# Patient Record
Sex: FEMALE | Race: ASIAN
[De-identification: names, ages, dates, MRNs, and addresses within clinical notes are randomized per-mention and may not be internally consistent; named-entity substitution may affect disease eponyms.]

---

## 2021-12-04 ENCOUNTER — HOSPITAL ENCOUNTER (EMERGENCY)
Dept: HOSPITAL 76 - ED | Age: 51
Discharge: HOME | End: 2021-12-04
Payer: COMMERCIAL

## 2021-12-04 VITALS — DIASTOLIC BLOOD PRESSURE: 95 MMHG | SYSTOLIC BLOOD PRESSURE: 119 MMHG

## 2021-12-04 DIAGNOSIS — D72.819: ICD-10-CM

## 2021-12-04 DIAGNOSIS — U07.1: Primary | ICD-10-CM

## 2021-12-04 DIAGNOSIS — J12.82: ICD-10-CM

## 2021-12-04 LAB
ALBUMIN DIAFP-MCNC: 3.8 G/DL (ref 3.2–5.5)
ALBUMIN/GLOB SERPL: 1.2 {RATIO} (ref 1–2.2)
ALP SERPL-CCNC: 46 IU/L (ref 42–121)
ALT SERPL W P-5'-P-CCNC: 20 IU/L (ref 10–60)
ANION GAP SERPL CALCULATED.4IONS-SCNC: 10 MMOL/L (ref 6–13)
AST SERPL W P-5'-P-CCNC: 35 IU/L (ref 10–42)
BASOPHILS # BLD MANUAL: 0 10^3/UL (ref 0–0.1)
BASOPHILS NFR BLD AUTO: 0.4 %
BILIRUB BLD-MCNC: 0.5 MG/DL (ref 0.2–1)
BUN SERPL-MCNC: 8 MG/DL (ref 6–20)
CALCIUM UR-MCNC: 8.3 MG/DL (ref 8.5–10.3)
CHLORIDE SERPL-SCNC: 102 MMOL/L (ref 101–111)
CO2 SERPL-SCNC: 24 MMOL/L (ref 21–32)
CREAT SERPLBLD-SCNC: 0.6 MG/DL (ref 0.4–1)
EOSINOPHIL # BLD MANUAL: 0 10^3/UL (ref 0–0.7)
EOSINOPHIL NFR BLD AUTO: 0.4 %
ERYTHROCYTE [DISTWIDTH] IN BLOOD BY AUTOMATED COUNT: 12.3 % (ref 12–15)
GFRSERPLBLD MDRD-ARVRAT: 106 ML/MIN/{1.73_M2} (ref 89–?)
GLOBULIN SER-MCNC: 3.3 G/DL (ref 2.1–4.2)
GLUCOSE SERPL-MCNC: 94 MG/DL (ref 70–100)
HCT VFR BLD AUTO: 39.6 % (ref 37–47)
HGB UR QL STRIP: 13 G/DL (ref 12–16)
LIPASE SERPL-CCNC: 29 U/L (ref 22–51)
LYMPH ABN NFR BLD MANUAL: 0 %
LYMPHOBLASTS # BLD: 21 %
LYMPHOCYTES # BLD MANUAL: 0.6 10^3/UL (ref 1.5–3.5)
LYMPHOCYTES NFR BLD AUTO: 22.5 %
MANUAL DIF COMMENT BLD-IMP: (no result)
MCH RBC QN AUTO: 27.8 PG (ref 27–31)
MCHC RBC AUTO-ENTMCNC: 32.8 G/DL (ref 32–36)
MCV RBC AUTO: 84.6 FL (ref 81–99)
METAMYELOCYTES NFR BLD: 2 %
MONOCYTES # BLD MANUAL: 0.1 10^3/UL (ref 0–1)
MONOCYTES NFR BLD AUTO: 3.7 %
NEUTROPHILS # SNV AUTO: 2.7 X10^3/UL (ref 4.8–10.8)
NEUTROPHILS NFR BLD AUTO: 73 %
NEUTROPHILS NFR BLD MANUAL: 2 10^3/UL (ref 1.5–6.6)
NEUTS BAND NFR BLD: 13 %
PDW BLD AUTO: 9.4 FL (ref 7.9–10.8)
PLAT MORPH BLD: (no result)
PLATELET # BLD: 172 10^3/UL (ref 130–450)
PLATELET BLD QL SMEAR: (no result)
POTASSIUM SERPL-SCNC: 3.1 MMOL/L (ref 3.5–5)
PROT SPEC-MCNC: 7.1 G/DL (ref 6.7–8.2)
RBC MAR: 4.68 10^6/UL (ref 4.2–5.4)
RBC MORPH BLD: (no result)
SODIUM SERPLBLD-SCNC: 136 MMOL/L (ref 135–145)

## 2021-12-04 PROCEDURE — 83690 ASSAY OF LIPASE: CPT

## 2021-12-04 PROCEDURE — 85025 COMPLETE CBC W/AUTO DIFF WBC: CPT

## 2021-12-04 PROCEDURE — 80053 COMPREHEN METABOLIC PANEL: CPT

## 2021-12-04 PROCEDURE — 99284 EMERGENCY DEPT VISIT MOD MDM: CPT

## 2021-12-04 PROCEDURE — 36415 COLL VENOUS BLD VENIPUNCTURE: CPT

## 2021-12-04 NOTE — ED PHYSICIAN DOCUMENTATION
History of Present Illness





- Stated complaint


Stated Complaint: C+





- Chief complaint


Chief Complaint: Resp





- Additonal information


Additional information: 





51-year-old female presents emergency department for evaluation of cough and 

chest pain.  States that she began having cough loss of taste and smell 1 week 

ago today.  She took a home Covid test and was positive.  She repeated the test 

today and remains positive.  She is interested in Mab therapy.  She is not yet 

vaccinated for COVID-19.  Reports that she has a history of vertigo and was 

afraid the vaccine would make it worse.  She is waiting to speak with her 

primary provider.





Patient denies any history of hypertension or diabetes.  She is not immune 

compromised.  She is a non-smoker. BMI is 20.6











Review of Systems


Constitutional: denies: Fever, Chills


Eyes: reports: Reviewed and negative


Ears: reports: Reviewed and negative


Nose: reports: Reviewed and negative


Throat: reports: Reviewed and negative


Cardiac: reports: Chest pain / pressure.  denies: Palpitations, Pedal edema, 

Calf pain, Reviewed and negative


Respiratory: reports: Dyspnea, Cough.  denies: Hemoptysis, Wheezing


GI: reports: Reviewed and negative


: reports: Reviewed and negative


Skin: reports: Reviewed and negative


Musculoskeletal: reports: Reviewed and negative





PD PAST MEDICAL HISTORY





- Present Medications


Home Medications: 


                                Ambulatory Orders











 Medication  Instructions  Recorded  Confirmed


 


Amox/Clav 875/125 [Augmentin] 1 each PO Q12H #14 tablet 12/04/21 


 


Azithromycin [Zithromax] 0 mg PO DAILY #6 tablet 12/04/21 


 


Benzonatate [Tessalon] 100 mg PO TID PRN #30 cap 12/04/21 














- Allergies


Allergies/Adverse Reactions: 


                                    Allergies











Allergy/AdvReac Type Severity Reaction Status Date / Time


 


No Known Drug Allergies Allergy   Verified 12/04/21 16:36














PD ED PE EXPANDED





- General


General: Alert, No acute distress, Well developed/nourished





- Cardiac


Cardiac: Regular Rate, Radial strong equal, Pedal strong equal, Cap refill < 2 

sec.  No: Murmur Present





- Respiratory


Respiratory: Clear to ausultation panfilo.  No: Distress, Labored





- Abdomen


Abdomen: Normal Bowel sounds.  No: Tender to palpation





- Back


Back: Normal exam





- Derm


Derm: Normal color, Warm and dry.  No: Rash





- Neuro


Neuro: Alert and Oriented X 3, CNII-XII intact





- GCS


Eye Opening: Spontaneous


Motor: Obeys Commands


Verbal: Oriented


Total: 15





Results





- Vitals


Vitals: 





                               Vital Signs - 24 hr











  12/04/21





  16:31


 


Temperature 38.2 C H


 


Heart Rate 74


 


Respiratory 19





Rate 


 


Blood Pressure 108/68


 


O2 Saturation 98








                                     Oxygen











O2 Source                      Room air

















PD MEDICAL DECISION MAKING





- ED course


Complexity details: reviewed results, re-evaluated patient, considered 

differential, d/w patient


ED course: 





51-year-old female presents the emergency department for evaluation of cough 

congestion chest pain.  She tested positive for COVID-19 1 week ago when she 

developed symptoms.  She is not vaccinated for COVID-19.





She has no history of cardiac or pulmonary disease.  She is not immune 

compromise.  No history of cancer.  She takes no routinely prescribed 

medications.  She is not a smoker.  Her BMI is 20.6.  Unfortunately she is not a

 candidate for Mab therapy.





Screening chest x-ray today does confirm bilateral infiltrative process 

consistent with a Covid pneumonia.  However reassuringly cardiopulmonary 

auscultation is rather clear.  She is not dyspneic, labored or tachypneic.  Room

 air saturations are 98 to 100%.





Screening labs are consistent with leukopenia often seen in COVID-19 infection. 

 The rest of the labs showed no worrisome or treatable electrolyte 

abnormalities.





Discussed with the patient that she is unfortunately not a candidate for Mab 

therapy.  I will send a prescription for Augmentin as well as azithromycin and 

Tessalon Perles to the pharmacy.  Current guidelines do not recommend outpatient

 steroid treatment for COVID-19 therefore will defer today.





Return precautions were discussed for worsening symptoms, increased dyspnea any 

hypoxia or severely labored breathing.





Departure





- Departure


Disposition: 01 Home, Self Care


Clinical Impression: 


 Pneumonia due to COVID-19 virus





Condition: Serious


Record reviewed to determine appropriate education?: Yes


Follow-Up: 


Rad Packer MD [Primary Care Provider] - 


Prescriptions: 


Amox/Clav 875/125 [Augmentin] 1 each PO Q12H #14 tablet


Benzonatate [Tessalon] 100 mg PO TID PRN #30 cap


 PRN Reason: Cough


Azithromycin [Zithromax] 0 mg PO DAILY #6 tablet


Comments: 


Yuki you tested positive at home for COVID-19 a week ago.  You came to the ER 

today with chest pain and a cough.  The x-ray does confirm that you have COVID-

19 pneumonia.  However reassuringly the auscultation of your lungs is normal.  

Your oxygen saturations are normal without the requirement of oxygen therapy.





Your screening labs show a decrease in your white blood cell count which is 

often seen with COVID-19 infections.





You do not meet the FDA criteria for monoclonal antibody therapy as you are body

 mass index is not elevated enough, you do not have any pre-existing immune 

compromise, cardiac or lung conditions.





I have [place] a [place]  I have also sent a prescription for Tessalon Perles a 

medication that can be helpful in reducing the severity of your cough.





In general most patients with COVID-19 will begin to feel better after about 7-

14 days. I recommend that you continue to quarantine for at least 1 more week.





If at any point you are having increased difficulty breathing, you have oxygen 

saturations less than 92% at home, you have a racing heart rate higher than 110,

 you faint or feel that your symptoms are worsening then do not hesitate to 

return immediately to the ER for a second evaluation.

## 2021-12-04 NOTE — XRAY REPORT
PROCEDURE:  Chest 1 View X-Ray

 

INDICATIONS:  C+

 

TECHNIQUE:  One view of the chest was acquired.  

 

COMPARISON:  None.

 

FINDINGS:  

 

Surgical changes and devices:  None.  

 

Lungs and pleura:  There are bilateral patchy indistinct opacities, right greater than left, with a b
asilar predominance. No pleural effusions or pneumothorax.  

 

Mediastinum:  Mediastinal contours appear normal.  Heart size is normal.  

 

Bones and chest wall:  No suspicious bony lesions.  Overlying soft tissues appear unremarkable.  

 

IMPRESSION:  

 

1. Patchy bilateral indistinct opacities suggestive of atypical pneumonia given clinical history.

 

Reviewed by: Russell Badillo MD on 12/4/2021 4:20 PM [facility]

Approved by: Russell Badillo MD on 12/4/2021 4:20 PM [facility]

 

 

Station ID:  [id number]

## 2021-12-08 ENCOUNTER — HOSPITAL ENCOUNTER (OUTPATIENT)
Dept: HOSPITAL 76 - EMS | Age: 51
Discharge: ANOTHER HOSPITAL | End: 2021-12-08
Payer: COMMERCIAL

## 2021-12-08 ENCOUNTER — HOSPITAL ENCOUNTER (INPATIENT)
Dept: HOSPITAL 76 - ED | Age: 51
LOS: 7 days | Discharge: HOME | DRG: 177 | End: 2021-12-15
Attending: NURSE PRACTITIONER | Admitting: SPECIALIST
Payer: COMMERCIAL

## 2021-12-08 DIAGNOSIS — U07.1: Primary | ICD-10-CM
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
## 2021-12-08 DIAGNOSIS — D72.829: ICD-10-CM

## 2021-12-08 DIAGNOSIS — J12.82: ICD-10-CM

## 2021-12-08 DIAGNOSIS — F17.210: ICD-10-CM

## 2021-12-08 DIAGNOSIS — T38.0X5A: ICD-10-CM

## 2021-12-08 DIAGNOSIS — J96.01: ICD-10-CM

## 2021-12-08 DIAGNOSIS — E86.0: ICD-10-CM

## 2021-12-08 LAB
ALBUMIN DIAFP-MCNC: 3.3 G/DL (ref 3.2–5.5)
ALBUMIN/GLOB SERPL: 0.8 {RATIO} (ref 1–2.2)
ALP SERPL-CCNC: 49 IU/L (ref 42–121)
ALT SERPL W P-5'-P-CCNC: 37 IU/L (ref 10–60)
ANION GAP SERPL CALCULATED.4IONS-SCNC: 14 MMOL/L (ref 6–13)
AST SERPL W P-5'-P-CCNC: 51 IU/L (ref 10–42)
B PARAPERT DNA SPEC QL NAA+PROBE: NOT DETECTED
B PERT DNA SPEC QL NAA+PROBE: NOT DETECTED
BASOPHILS # BLD MANUAL: 0.1 10^3/UL (ref 0–0.1)
BASOPHILS NFR BLD AUTO: 0.3 %
BASOPHILS NFR SPEC MANUAL: 2 %
BILIRUB BLD-MCNC: 0.6 MG/DL (ref 0.2–1)
BUN SERPL-MCNC: 8 MG/DL (ref 6–20)
C PNEUM DNA NPH QL NAA+NON-PROBE: NOT DETECTED
CALCIUM UR-MCNC: 8.3 MG/DL (ref 8.5–10.3)
CHLORIDE SERPL-SCNC: 101 MMOL/L (ref 101–111)
CO2 SERPL-SCNC: 23 MMOL/L (ref 21–32)
CREAT SERPLBLD-SCNC: 0.5 MG/DL (ref 0.4–1)
EOSINOPHIL # BLD MANUAL: 0 10^3/UL (ref 0–0.7)
EOSINOPHIL NFR BLD AUTO: 0.3 %
ERYTHROCYTE [DISTWIDTH] IN BLOOD BY AUTOMATED COUNT: 12.3 % (ref 12–15)
FLUAV RNA RESP QL NAA+PROBE: NOT DETECTED
GFRSERPLBLD MDRD-ARVRAT: 131 ML/MIN/{1.73_M2} (ref 89–?)
GLOBULIN SER-MCNC: 3.9 G/DL (ref 2.1–4.2)
GLUCOSE SERPL-MCNC: 88 MG/DL (ref 70–100)
HAEM INFLU B DNA SPEC QL NAA+PROBE: NOT DETECTED
HCOV 229E RNA SPEC QL NAA+PROBE: NOT DETECTED
HCOV HKU1 RNA UPPER RESP QL NAA+PROBE: NOT DETECTED
HCOV NL63 RNA ASPIRATE QL NAA+PROBE: NOT DETECTED
HCOV OC43 RNA SPEC QL NAA+PROBE: NOT DETECTED
HCT VFR BLD AUTO: 41.6 % (ref 37–47)
HGB UR QL STRIP: 13.7 G/DL (ref 12–16)
HMPV AG SPEC QL: NOT DETECTED
HPIV1 RNA NPH QL NAA+PROBE: NOT DETECTED
HPIV2 SPEC QL CULT: NOT DETECTED
HPIV3 AB TITR SER CF: NOT DETECTED {TITER}
HPIV4 RNA SPEC QL NAA+PROBE: NOT DETECTED
LIPASE SERPL-CCNC: 26 U/L (ref 22–51)
LYMPH ABN NFR BLD MANUAL: 0 %
LYMPHOBLASTS # BLD: 13 %
LYMPHOCYTES # BLD MANUAL: 0.8 10^3/UL (ref 1.5–3.5)
LYMPHOCYTES NFR BLD AUTO: 15.5 %
M PNEUMO DNA SPEC QL NAA+PROBE: NOT DETECTED
MANUAL DIF COMMENT BLD-IMP: (no result)
MCH RBC QN AUTO: 27.8 PG (ref 27–31)
MCHC RBC AUTO-ENTMCNC: 32.9 G/DL (ref 32–36)
MCV RBC AUTO: 84.6 FL (ref 81–99)
MONOCYTES # BLD MANUAL: 0.4 10^3/UL (ref 0–1)
MONOCYTES NFR BLD AUTO: 8.9 %
NEUTROPHILS # SNV AUTO: 6.1 X10^3/UL (ref 4.8–10.8)
NEUTROPHILS NFR BLD AUTO: 73.4 %
NEUTROPHILS NFR BLD MANUAL: 4.8 10^3/UL (ref 1.5–6.6)
NEUTS BAND NFR BLD: 14 %
PDW BLD AUTO: 8.8 FL (ref 7.9–10.8)
PLAT MORPH BLD: (no result)
PLATELET # BLD: 301 10^3/UL (ref 130–450)
PLATELET BLD QL SMEAR: (no result)
POTASSIUM SERPL-SCNC: 3.7 MMOL/L (ref 3.5–5)
PROT SPEC-MCNC: 7.2 G/DL (ref 6.7–8.2)
RBC MAR: 4.92 10^6/UL (ref 4.2–5.4)
RSV RNA RESP QL NAA+PROBE: NOT DETECTED
RV+EV RNA SPEC QL NAA+PROBE: NOT DETECTED
SARS-COV-2 RNA PNL SPEC NAA+PROBE: DETECTED
SODIUM SERPLBLD-SCNC: 138 MMOL/L (ref 135–145)

## 2021-12-08 PROCEDURE — 80053 COMPREHEN METABOLIC PANEL: CPT

## 2021-12-08 PROCEDURE — 80048 BASIC METABOLIC PNL TOTAL CA: CPT

## 2021-12-08 PROCEDURE — 96361 HYDRATE IV INFUSION ADD-ON: CPT

## 2021-12-08 PROCEDURE — 99285 EMERGENCY DEPT VISIT HI MDM: CPT

## 2021-12-08 PROCEDURE — 96374 THER/PROPH/DIAG INJ IV PUSH: CPT

## 2021-12-08 PROCEDURE — 85025 COMPLETE CBC W/AUTO DIFF WBC: CPT

## 2021-12-08 PROCEDURE — 87070 CULTURE OTHR SPECIMN AEROBIC: CPT

## 2021-12-08 PROCEDURE — 0202U NFCT DS 22 TRGT SARS-COV-2: CPT

## 2021-12-08 PROCEDURE — 83690 ASSAY OF LIPASE: CPT

## 2021-12-08 PROCEDURE — 94640 AIRWAY INHALATION TREATMENT: CPT

## 2021-12-08 PROCEDURE — 36415 COLL VENOUS BLD VENIPUNCTURE: CPT

## 2021-12-08 PROCEDURE — 80076 HEPATIC FUNCTION PANEL: CPT

## 2021-12-08 PROCEDURE — XW033E5 INTRODUCTION OF REMDESIVIR ANTI-INFECTIVE INTO PERIPHERAL VEIN, PERCUTANEOUS APPROACH, NEW TECHNOLOGY GROUP 5: ICD-10-PCS | Performed by: SPECIALIST

## 2021-12-08 PROCEDURE — 3E0333Z INTRODUCTION OF ANTI-INFLAMMATORY INTO PERIPHERAL VEIN, PERCUTANEOUS APPROACH: ICD-10-PCS | Performed by: SPECIALIST

## 2021-12-08 PROCEDURE — 87205 SMEAR GRAM STAIN: CPT

## 2021-12-08 PROCEDURE — 99284 EMERGENCY DEPT VISIT MOD MDM: CPT

## 2021-12-08 PROCEDURE — 94761 N-INVAS EAR/PLS OXIMETRY MLT: CPT

## 2021-12-08 PROCEDURE — 96375 TX/PRO/DX INJ NEW DRUG ADDON: CPT

## 2021-12-08 PROCEDURE — 71045 X-RAY EXAM CHEST 1 VIEW: CPT

## 2021-12-08 RX ADMIN — OXYCODONE PRN MG: 5 TABLET ORAL at 21:51

## 2021-12-08 RX ADMIN — SODIUM CHLORIDE, POTASSIUM CHLORIDE, SODIUM LACTATE AND CALCIUM CHLORIDE SCH MLS/HR: 600; 310; 30; 20 INJECTION, SOLUTION INTRAVENOUS at 13:47

## 2021-12-08 RX ADMIN — OXYCODONE PRN MG: 5 TABLET ORAL at 17:21

## 2021-12-08 RX ADMIN — SODIUM CHLORIDE, PRESERVATIVE FREE SCH: 5 INJECTION INTRAVENOUS at 23:52

## 2021-12-08 RX ADMIN — SODIUM CHLORIDE, POTASSIUM CHLORIDE, SODIUM LACTATE AND CALCIUM CHLORIDE SCH MLS/HR: 600; 310; 30; 20 INJECTION, SOLUTION INTRAVENOUS at 23:52

## 2021-12-08 RX ADMIN — SODIUM CHLORIDE, PRESERVATIVE FREE SCH ML: 5 INJECTION INTRAVENOUS at 16:22

## 2021-12-08 RX ADMIN — APIXABAN SCH MG: 2.5 TABLET, FILM COATED ORAL at 16:22

## 2021-12-08 NOTE — ADVANCE CARE PLANNING NOTE
Advance Care Planning





- Planning Encounter


Date: 21


Time: 14:25


Purpose: 





establsih code status


Parties in Attendance: 





hospitalist and patient


Decisional Capacity of the Patient: 





alert, oriented, and is employed full time with appropriate speech and responses





- Diagnosis for Encounter


(1) Acute respiratory failure with hypoxia


Summary: 


She has no major medical illnesses.  Asthma was remote and she is never been 

hospitalized for asthma.  She has bilateral infiltrates on chest x-ray due to 

Covid pneumonia.  She is very started with how ill she is.











- Encounter


Subjective/Patient's Story: 





She was born and raised in the [facility] and came to the [place] when 

she was 10 years old.  Her mother  her stepfather.  She has been living in

the [place] since that time and is currently  to her first  

who is a [employer] .  She regard to self is independent, able to 

complete activities of daily living without any assist whatsoever.  She works 

full-time, takes care of her house, drives a car, pays bills.  This is the first

time she is ever been this ill.  She has never really thought about advance care

planning or had conversations with her  about advance care planning.





She is does state that she has a lot of ibis in her life.  Absolutely adores with

her grandchildren.  She has lots of plans for the future for when she retires 

and spends more time with her family.  With that in mind she states that the 

quality of her life is excellent and would like to continue with that.  As such 

she wants everything done with regards to treatment.  If it comes to tube 

feedings, intubation, CPR, blood transfusions, dialysis, etc., she would like 

all of that done.  She will think more about it while she is in the hospital.  

She will also talk to her .


Objective/Medical Story: 





51-year-old white female who has no past medical history that presents for a 

second time with cough, fever, fatigue.  She was seen in the emergency room a 

few days ago and diagnosed with Covid pneumonia but did not meet criteria for 

monoclonal antibody. She became ill  with fever, myalgias and loss of taste

and self tested with a drug store test and was positive.  Her exposure was due 

to her  who is a [employer]  and he was ill for 6 days and she 

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 They are both unvaccinated.  She retested on  becasue the 

cough, and fever, with poor po intake was so bad.  She was again Covid (+) so 

went to the ER. She was interested in getting the mab but did not meet criteria.

She was discharged on azithromycin.  Over the last few days she is getting 

increasingly nauseated, fatigue, unable to take her medications.  She has been 

more short of breath, fatigued, and has been lightheaded trying to get up and do

things.  She almost passed out in the shower this morning.  She cannot smell 

anything, does not want to eat anything.  She called EMS where room her 

saturation was 79 to 87% on room air.  In the emergency room she is been given 4

L nasal cannula and her O2 sat is 99%.  Her vitals are 37.2, pulse rate 108, 

blood pressure 108/75, respiratory rate 26, 97% saturated on 4 L.  Chest x-ray 

has bilateral pneumonia that is gotten worse since her initial Chest ray 

.





The patient is now admitted for acute respiratory failure due to Covid 

pneumonia.





- Past Medical History


Cardiovascular: reports: None


Respiratory: reports: Asthma (as a child and no treatment for decades)


Neuro: reports: None


Endocrine/Autoimmune: reports: None


GI: reports: None


GYN: reports: Other ()


: reports: None


HEENT: reports: None


Psych: reports: None


Musculoskeletal: reports: None


Derm: reports: None


MRSA Hx?: No





- Past Surgical History


/GYN: reports: Other


Goals of Care: 





to live to an old age and enjoy her children and grandchildren.


Plan: 





admit for covid pneumonia treatment and acute respiratory failure. Aim for full 

recovery with return to home with independent living. 


Code Status: Attempt Resuscitation


Time spent on advance care plannin minutes.

## 2021-12-08 NOTE — ED PHYSICIAN DOCUMENTATION
PD HPI URI





- Stated complaint


Stated Complaint: C+/SYNCOPE





- Chief complaint


Chief Complaint: Neuro





- History obtained from


History obtained from: Patient





- History of Present Illness


Timing - onset: How many weeks ago (1)


Timing duration: Weeks (1)


Timing details: Abrupt onset, Still present (worsening breathing, with general 

weakness, lightheaded with frequent vomiting and diarrhea the past 2-3 days.)


Associated symptoms: Fever, Nasal congestion, Dry cough, Dyspnea, NVD.  No: Sore

throat, Chest pain


Contributing factors: Unimmunized.  No: Sick contact, Immunocompromised, COPD / 

asthma


Similar symptoms before: Has not had sx before


Recently seen: Emergency Dept (4 days ago for URI/cough and had had positive 

home rapid Ag tests. Given script for zithromax and amox. Patient did not get 

steroids nor inhaler.)





Review of Systems


Constitutional: reports: Fever, Chills, Myalgias


Nose: reports: Congestion


Throat: denies: Sore throat


Cardiac: denies: Chest pain / pressure


Respiratory: reports: Cough


GI: reports: Nausea, Vomiting, Diarrhea.  denies: Abdominal Pain


Skin: denies: Rash, Lesions


Neurologic: reports: Generalized weakness, Headache.  denies: Altered mental 

status





PD PAST MEDICAL HISTORY





- Past Medical History


Past Medical History: Yes


Cardiovascular: None


Respiratory: Asthma


Neuro: None


Endocrine/Autoimmune: None


GI: None


GYN: None


: None


HEENT: None


Psych: None


Musculoskeletal: None


Derm: None





- Past Surgical History


Past Surgical History: Yes


/GYN: Other





- Present Medications


Home Medications: 


                                Ambulatory Orders











 Medication  Instructions  Recorded  Confirmed


 


Amox/Clav 875/125 [Augmentin] 1 each PO Q12H #14 tablet 12/04/21 12/08/21


 


Azithromycin [Zithromax] 0 mg PO DAILY #6 tablet 12/04/21 12/08/21


 


Benzonatate [Tessalon] 100 mg PO TID PRN #30 cap 12/04/21 12/08/21














- Allergies


Allergies/Adverse Reactions: 


                                    Allergies











Allergy/AdvReac Type Severity Reaction Status Date / Time


 


No Known Drug Allergies Allergy   Verified 12/08/21 09:38














- Social History


Does the pt smoke?: No


Smoking Status: Never smoker


Does the pt drink ETOH?: No


Does the pt have substance abuse?: No





- Immunizations


Immunizations are current?: No


Immunizations: Other immun not current





PD ED PE NORMAL





- Vitals


Vital signs reviewed: Yes





- General


General: Alert and oriented X 3, Well developed/nourished





- HEENT


HEENT: Pharynx benign.  No: Moist mucous membranes





- Neck


Neck: Supple, no meningeal sign, No adenopathy





- Cardiac


Cardiac: RRR (tachycardic but regular. ), No murmur





- Respiratory


Respiratory: No respiratory distress.  No: Clear bilaterally (diffuse coarse 

sounds and wheezing. )





- Abdomen


Abdomen: Soft, Non tender, Non distended





- Derm


Derm: Normal color, Warm and dry





- Extremities


Extremities: No edema, No calf tenderness / cord





- Neuro


Neuro: Alert and oriented X 3, No motor deficit, Normal speech





Results





- Vitals


Vitals: 


                               Vital Signs - 24 hr











  12/08/21 12/08/21 12/08/21





  09:38 09:51 10:58


 


Temperature 37.2 C  


 


Heart Rate 108 H 57 L 74


 


Respiratory 26 H 24 24





Rate   


 


Blood Pressure 108/75 127/83 H 


 


O2 Saturation 97 97 








                                     Oxygen











O2 Source                      Nasal cannula


 


Oxygen Flow Rate               2

















- Labs


Labs: 


                                Laboratory Tests











  12/08/21 12/08/21 12/08/21





  10:50 11:31 11:31


 


WBC   6.1 


 


RBC   4.92 


 


Hgb   13.7 


 


Hct   41.6 


 


MCV   84.6 


 


MCH   27.8 


 


MCHC   32.9 


 


RDW   12.3 


 


Plt Count   301 


 


MPV   8.8 


 


Neut # (Auto)   Not Reportable 


 


Lymph # (Auto)   Not Reportable 


 


Mono # (Auto)   Not Reportable 


 


Eos # (Auto)   Not Reportable 


 


Baso # (Auto)   Not Reportable 


 


Absolute Nucleated RBC   Not Reportable 


 


Total Counted   100 


 


Band Neuts % (Manual)   14 H 


 


Abnorm Lymph % (Manual)   0 


 


Nucleated RBC %   Not Reportable 


 


Neutrophils # (Manual)   4.8 


 


Lymphocytes # (Manual)   0.8 L 


 


Monocytes # (Manual)   0.4 


 


Eosinophils # (Manual)   0.0 


 


Basophils # (Manual)   0.1 


 


Differential Comment   MANUAL DIFFERENTIAL 


 


Manual Slide Review   Indicated 


 


Platelet Estimate   NORMAL (130-450,000) 


 


Platelet Morphology   NORMAL APPEARANCE 


 


Sodium    138


 


Potassium    3.7


 


Chloride    101


 


Carbon Dioxide    23


 


Anion Gap    14.0 H


 


BUN    8


 


Creatinine    0.5


 


Estimated GFR (MDRD)    131


 


Glucose    88


 


Calcium    8.3 L


 


Total Bilirubin    0.6


 


AST    51 H


 


ALT    37


 


Alkaline Phosphatase    49


 


Total Protein    7.2


 


Albumin    3.3


 


Globulin    3.9


 


Albumin/Globulin Ratio    0.8 L


 


Lipase    26


 


Nasal Adenovirus (PCR)  NOT DETECTED  


 


Nasal B. parapertussis DNA (PCR)  NOT DETECTED  


 


Nasal Coronavir 229E PCR  NOT DETECTED  


 


Nasal Coronavir HKU1 PCR  NOT DETECTED  


 


Nasal Coronavir NL63 PCR  NOT DETECTED  


 


Nasal Coronavir OC43 PCR  NOT DETECTED  


 


Nasal Enterovir/Rhinovir PCR  NOT DETECTED  


 


Nasal Influenza B PCR  NOT DETECTED  


 


Nasal Influenza A PCR  NOT DETECTED  


 


Nasal Parainfluen 1 PCR  NOT DETECTED  


 


Nasal Parainfluen 2 PCR  NOT DETECTED  


 


Nasal Parainfluen 3 PCR  NOT DETECTED  


 


Nasal Parainfluen 4 PCR  NOT DETECTED  


 


Nasal RSV (PCR)  NOT DETECTED  


 


Nasal B.pertussis DNA PCR  NOT DETECTED  


 


Nasal C.pneumoniae (PCR)  NOT DETECTED  


 


Jose Human Metapneumo PCR  NOT DETECTED  


 


Nasal M.pneumoniae (PCR)  NOT DETECTED  


 


Nasal SARS-CoV-2 (PCR)  DETECTED A  














- Rads (name of study)


  ** chest xray


Radiology: Prelim report reviewed (increased bilateral pneumonia.), See rad 

report





PD MEDICAL DECISION MAKING





- ED course


Complexity details: reviewed results (worsening chest xray. ), re-evaluated 

patient (her nausea and vomiting may be part of the COVID illness, or consider 

side effects of the recent Abx scripts. ), considered differential (Covid 

pneumonia with nausea and vomiting and dehydration.  O2 sats were significantly 

low prehospital.  Here are improved but still running 89 to 90% on room air with

 slight activity in the room.  Appears less dehydrated.), d/w patient





Departure





- Departure


Disposition: [facility] DC/Xfer


Clinical Impression: 


 Nausea vomiting and diarrhea, Pneumonia due to COVID-19 virus, Dehydration, 

Hypoxia





Condition: Stable


Record reviewed to determine appropriate education?: Yes


Discharge Date/Time: 12/08/21 13:15

## 2021-12-08 NOTE — HISTORY & PHYSICAL EXAMINATION
Chief Complaint





- Chief Complaint


Chief Complaint: near syncope in shower w Covid dx x 7 days





History of Present Illness





- Admitted From


Admitted From:: home via EMS





- History Obtained From


Records Reviewed: [facility]


History obtained from: patient and Dr. Campos


Exam Limitations: none





- History of Present Illness


HPI Comment/Other: 





51-year-old white female who has no past medical history that presents for a 

second time with cough, fever, fatigue.  She was seen in the emergency room a 

few days ago and diagnosed with Covid pneumonia but did not meet criteria for 

monoclonal antibody. She became ill  with fever, myalgias and loss of taste

and self tested with a drug store test and was positive.  Her exposure was due 

to her  who is a [employer]  and he was ill for 6 days and she 

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 They are both unvaccinated.  She retested on  becasue the 

cough, and fever, with poor po intake was so bad.  She was again Covid (+) so 

went to the ER. She was interested in getting the mab but did not meet criteria.

She was discharged on azithromycin.  Over the last few days she is getting incr

easingly nauseated, fatigue, unable to take her medications.  She has been more 

short of breath, fatigued, and has been lightheaded trying to get up and do 

things.  She almost passed out in the shower this morning.  She cannot smell 

anything, does not want to eat anything.  She called EMS where room her 

saturation was 79 to 87% on room air.  In the emergency room she is been given 4

L nasal cannula and her O2 sat is 99%.  Her vitals are 37.2, pulse rate 108, 

blood pressure 108/75, respiratory rate 26, 97% saturated on 4 L.  Chest x-ray 

has bilateral pneumonia that is gotten worse since her initial Chest ray 

.





The patient is now admitted for acute respiratory failure due to Covid p

neumonia.





History





- Past Medical History


Cardiovascular: reports: None


Respiratory: reports: Asthma (as a child and no treatment for decades)


Neuro: reports: None


Endocrine/Autoimmune: reports: None


GI: reports: None


GYN: reports: Other ()


: reports: None


HEENT: reports: None


Psych: reports: None


Musculoskeletal: reports: None


Derm: reports: None


MRSA Hx?: No





- Past Surgical History


/GYN: reports: Other





- Family & Social History


Family History Comment/Other: birth dad  of MI age 55, PMH of HTN, smoker, 

and drinker.  Mom  of heart in her 80s.  One sister w DM, one brother w HTN.

 2 daughters are healthy


Living arrangement: At home


Living Situation: With spouse/s.o.


Social History Notes: She was born and raised in the [facility].  Came to the 

[place] when her mom  her step dad.  She was 10 or so. Is  to a [employer] 

[employer]  who is retired . She smokes infrequently since her teens.

Last cig was 3 months ago and is 1-2 very rarely. Maybe once a year. Alcohol is 

once a month if at all. 2 glasses of wine if she does. No hx of alcohol abuse or

recreational substance. 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 Lives w her  in their own home.





- Substance History


Use: Uses substance without health or social issues: Tobacco, Alcohol


Abuse: Recurrent use of substance despite neg consequences: NONE


Dependence: Experiences withdrawal or developed tolerances: NONE


Tobacco Details: Cigarettes





- POLST


Patient has POLST: No


POLST Status: Full Code





Meds/Allgy





- Home Medications


Home Medications: 


                                Ambulatory Orders











 Medication  Instructions  Recorded  Confirmed


 


Amox/Clav 875/125 [Augmentin] 1 each PO Q12H #14 tablet 21


 


Azithromycin [Zithromax] 0 mg PO DAILY #6 tablet 21


 


Benzonatate [Tessalon] 100 mg PO TID PRN #30 cap 21














- Allergies


Allergies/Adverse Reactions: 


                                    Allergies











Allergy/AdvReac Type Severity Reaction Status Date / Time


 


No Known Drug Allergies Allergy   Verified 21 09:38














Review of Systems





- Constitutional


Constitutional: reports: Fatigue, Fever, Malaise, Weakness, Poor appetite





- Eyes


Eyes: denies: Pain, Irritation, Amaurosis, Blurred vision, Vision loss, Dipolpia





- Ears, Nose & Throat


Ears, Nose & Throat: reports: Nasal congestion, Sore throat, Hoarseness.  

denies: Hearing loss, Hearing aids





- Cardiovascular


Cariovascular: reports: Chest pain (pleuritic and worse w coughing), 

Lightheadedness, Syncope, Exertional dyspnea, Decr. exercise tolerance, 

Orthopnea.  denies: Irregular heart rate, Palpitations, Edema





- Respiratory


Respiratory: reports: Cough, Orthopnea, SOB at rest, SOB with exertion, 

Pleuritic pain.  denies: Sputum production, Wheezing, Snoring, Apnea, Stridor





- Gastrointestinal


Gastrointestinal: reports: Nausea, Poor appetite





- Genitourinary


Genitourinary: denies: Dysuria, Frequency, Urgency, Hematuria





- Musculoskeletal


Musculoskeletal: reports: Muscle pain, Muscle aches, Stiffness





- Integumentary


Integumentary: denies: Rash, Pruritis, Lesions, Dryness





- Neurological


Neurological: reports: General weakness, Headache.  denies: Numbness, Memory 

problems, Pre-existing deficit, Abnormal gait





- Psychiatric


Psychiatric: denies: Depression, Anxiety, Suicidal, Delusions, Hallucinations





- Endocrine


Endocrine: denies: Polyuria, Polydypsia, Polyphagia





- Hematologic/Lymphatic


Hematologic/Lymphatic: denies: Anemia, Bruising, Petechiae, Blood clots, 

Lymphadenopathy


Prior Level of Functionality: 





Independent with activities of daily living, does not use any durable medical 

equipment at home.  Works full-time.





Exam





- Vital Signs


Reviewed Vital Signs: Yes


Vital Signs: 





                                Vital Signs x48h











  Temp Pulse Resp BP Pulse Ox


 


 21 10:58   74  24  


 


 21 09:51   57 L  24  127/83 H  97


 


 21 09:38  37.2 C  108 H  26 H  108/75  97














- Physical Exam


General Appearance: positive: Alert, Moderate distress (respiratory struggle 

when gets up to urinate at bedside. Gets tachycardic as well.), Other (thin, 

slender,  female.)


Eyes Bilateral: positive: PERRL, EOMI


ENT: positive: No signs of dehydration


Neck: positive: No JVD


Respiratory: positive: Rales


Cardiovascular: positive: Regular rate & rhythm.  negative: Systolic murmur, 

Gallop/S4, Friction rub


Peripheral Pulses: positive: 1+


Abdomen: positive: Non-tender, No organomegaly, Nml bowel sounds, No distention


Skin: positive: Warm, Dry, Pallor


Extremities: positive: Full ROM, No pedal edema


Neurologic/Psychiatric: positive: Oriented x3, CN's nml (2-12).  negative: Motor

 nml (generalized weakness but able to sit up and walk on her own until the sob 

gets too severe)





Conclusion/Plan





- Problem List


(1) Acute respiratory failure with hypoxia


Conclusion/Plan: 


Due to bilateral pneumonia which is due to Covid.  Treatment will be dressed 

below.  At this time the patient will be placed on nasal cannula.  If need be we

 will transition to high flow nasal cannula then Oxymizer then intubation.








(2) Pneumonia due to COVID-19 virus


Conclusion/Plan: 


We will start remdesivir.  Today is day .


We will start Decadron.  Today is day 1/10.


DVT prophylaxis will be with Eliquis 2.5 mg p.o. twice daily. But pharmacy would

 like to go back to [facility]. 


Watch for symptoms of secondary pneumonia








- Lab Results


Lab results reviewed: Yes


Fish Bones: 


                                 21 11:31





                                 21 11:31





- Diagnostic Imaging Results


Diagnostic Imaging Results: positive: Final report reviewed


Diagnostic Imaging Results Comments: 





Chest x-ray with increased bilateral pneumonia.  No effusions or pneumothorax 

but she has moderate bibasilar predominant reticular nodular pulmonary 

opacities, worse in the right lung.  Overall increased since 2021.





Core Measures





- Anticipated LOS


I expect patient to be DC'd or transferred within 96 hours.: Yes





- DVT/VTE - Prophylaxis


VTE/DVT Device ordered at admit?: Yes

## 2021-12-08 NOTE — XRAY REPORT
PROCEDURE:  Chest 1 View X-Ray

 

INDICATIONS:  increased dyspnea/cough

 

TECHNIQUE:  One view of the chest was acquired.  

 

COMPARISON:  12/4/2021

 

FINDINGS:  

 

Surgical changes and devices:  None.  

 

Lungs and pleura:  No pleural effusions or pneumothorax. Moderate basilar predominant reticular nodul
ar pulmonary opacity, worse in the right lung base, overall increased.

 

Mediastinum:  Mediastinal contours appear normal.  Heart size is normal.  

 

Bones and chest wall:  No suspicious bony lesions.  Overlying soft tissues appear unremarkable.  

 

IMPRESSION:  

Increased bilateral pneumonia.

 

Reviewed by: Sanjay Landeros MD on 12/8/2021 11:05 AM PST

Approved by: Sanjay Landeros MD on 12/8/2021 11:05 AM [facility]

 

 

Station ID:  [id number]

## 2021-12-09 LAB
ANION GAP SERPL CALCULATED.4IONS-SCNC: 12 MMOL/L (ref 6–13)
BASOPHILS NFR BLD AUTO: 0 10^3/UL (ref 0–0.1)
BASOPHILS NFR BLD AUTO: 0.2 %
BUN SERPL-MCNC: 7 MG/DL (ref 6–20)
CALCIUM UR-MCNC: 8.2 MG/DL (ref 8.5–10.3)
CHLORIDE SERPL-SCNC: 103 MMOL/L (ref 101–111)
CO2 SERPL-SCNC: 24 MMOL/L (ref 21–32)
CREAT SERPLBLD-SCNC: 0.4 MG/DL (ref 0.4–1)
EOSINOPHIL # BLD AUTO: 0 10^3/UL (ref 0–0.7)
EOSINOPHIL NFR BLD AUTO: 0 %
ERYTHROCYTE [DISTWIDTH] IN BLOOD BY AUTOMATED COUNT: 12.3 % (ref 12–15)
GFRSERPLBLD MDRD-ARVRAT: 170 ML/MIN/{1.73_M2} (ref 89–?)
GLUCOSE SERPL-MCNC: 124 MG/DL (ref 70–100)
HCT VFR BLD AUTO: 36.7 % (ref 37–47)
HGB UR QL STRIP: 12.3 G/DL (ref 12–16)
LYMPHOCYTES # SPEC AUTO: 1.4 10^3/UL (ref 1.5–3.5)
LYMPHOCYTES NFR BLD AUTO: 16.3 %
MANUAL DIF COMMENT BLD-IMP: (no result)
MCH RBC QN AUTO: 27.7 PG (ref 27–31)
MCHC RBC AUTO-ENTMCNC: 33.5 G/DL (ref 32–36)
MCV RBC AUTO: 82.7 FL (ref 81–99)
MONOCYTES # BLD AUTO: 1 10^3/UL (ref 0–1)
MONOCYTES NFR BLD AUTO: 11.4 %
NEUTROPHILS # BLD AUTO: 5.8 10^3/UL (ref 1.5–6.6)
NEUTROPHILS # SNV AUTO: 8.5 X10^3/UL (ref 4.8–10.8)
NEUTROPHILS NFR BLD AUTO: 68.1 %
NRBC # BLD AUTO: 0 /100WBC
NRBC # BLD AUTO: 0 X10^3/UL
PDW BLD AUTO: 9.1 FL (ref 7.9–10.8)
PLAT MORPH BLD: (no result)
PLATELET # BLD: 369 10^3/UL (ref 130–450)
PLATELET BLD QL SMEAR: (no result)
POTASSIUM SERPL-SCNC: 3.6 MMOL/L (ref 3.5–5)
RBC MAR: 4.44 10^6/UL (ref 4.2–5.4)
RBC MORPH BLD: (no result)
SODIUM SERPLBLD-SCNC: 139 MMOL/L (ref 135–145)
WBC MORPH BLD: (no result)

## 2021-12-09 RX ADMIN — SODIUM CHLORIDE, PRESERVATIVE FREE SCH ML: 5 INJECTION INTRAVENOUS at 23:58

## 2021-12-09 RX ADMIN — OXYCODONE PRN MG: 5 TABLET ORAL at 20:42

## 2021-12-09 RX ADMIN — SODIUM CHLORIDE, PRESERVATIVE FREE SCH ML: 5 INJECTION INTRAVENOUS at 09:49

## 2021-12-09 RX ADMIN — APIXABAN SCH MG: 2.5 TABLET, FILM COATED ORAL at 09:47

## 2021-12-09 RX ADMIN — OXYCODONE PRN MG: 5 TABLET ORAL at 09:45

## 2021-12-09 RX ADMIN — DEXAMETHASONE SODIUM PHOSPHATE SCH MG: 10 INJECTION, SOLUTION INTRAMUSCULAR; INTRAVENOUS at 09:47

## 2021-12-09 RX ADMIN — SODIUM CHLORIDE, PRESERVATIVE FREE SCH: 5 INJECTION INTRAVENOUS at 17:41

## 2021-12-09 RX ADMIN — OXYCODONE HYDROCHLORIDE AND ACETAMINOPHEN SCH MG: 500 TABLET ORAL at 13:51

## 2021-12-09 RX ADMIN — APIXABAN SCH MG: 2.5 TABLET, FILM COATED ORAL at 20:42

## 2021-12-09 RX ADMIN — ACETAMINOPHEN PRN MG: 325 TABLET ORAL at 13:52

## 2021-12-09 RX ADMIN — OXYCODONE PRN MG: 5 TABLET ORAL at 13:52

## 2021-12-09 RX ADMIN — ACETAMINOPHEN PRN MG: 325 TABLET ORAL at 20:42

## 2021-12-09 RX ADMIN — SODIUM CHLORIDE SCH MLS/HR: 9 INJECTION, SOLUTION INTRAVENOUS at 12:20

## 2021-12-09 RX ADMIN — Medication SCH MG: at 13:51

## 2021-12-09 RX ADMIN — Medication SCH MCG: at 13:51

## 2021-12-09 RX ADMIN — OXYCODONE PRN MG: 5 TABLET ORAL at 02:11

## 2021-12-09 RX ADMIN — ACETAMINOPHEN PRN MG: 325 TABLET ORAL at 09:48

## 2021-12-09 NOTE — PROVIDER PROGRESS NOTE
Subjective





- Prog Note Date


Prog Note Date: 12/09/21


Prog Note Time: 17:49





- Subjective


Pt reports feeling: Worse


Subjective: 





she had intense diffuse chest tightness with burning all day yesterday, body 

aches severe. Today feels slightly better but 02 requirement has gone up





can taste a little. declines ensure bc it made her vomit.





Current Medications





- Current Medications


Current Medications: 





Active Medications





Acetaminophen (Acetaminophen 325 Mg Tablet)  650 mg PO Q4HR PRN


   PRN Reason: Pain 1 to 4


   Last Admin: 12/09/21 13:52 Dose:  650 mg


   Documented by: 


Apixaban (Apixaban 2.5 Mg Tablet)  2.5 mg PO BID [facility]


   Last Admin: 12/09/21 09:47 Dose:  2.5 mg


   Documented by: 


Ascorbic Acid (Ascorbic Acid 500 Mg Tablet)  500 mg PO DAILY [facility]


   Last Admin: 12/09/21 13:51 Dose:  500 mg


   Documented by: 


Cholecalciferol (Cholecalciferol 25 Mcg Tablet)  50 mcg PO DAILY [facility]


   Last Admin: 12/09/21 13:51 Dose:  50 mcg


   Documented by: 


Dexamethasone (Dexamethasone 10 Mg/Ml Vial)  10 mg IVP DAILY [facility]


   Last Admin: 12/09/21 09:47 Dose:  10 mg


   Documented by: 


Remdesivir 100 mg/ Sodium (Chloride)  100 mls @ 200 mls/hr IV DAILY [facility]


   Stop: 12/12/21 09:29


   Last Infusion: 12/09/21 16:49 Dose:  Infused


   Documented by: 


Ondansetron HCl (Ondansetron Odt 4 Mg Tablet)  4 mg TL Q6HR PRN


   PRN Reason: Nausea / Vomiting


   Last Admin: 12/08/21 17:21 Dose:  4 mg


   Documented by: 


Oxycodone HCl (Oxycodone 5 Mg Tablet)  5 mg PO Q4HR PRN


   PRN Reason: Pain 5 to 7


   Last Admin: 12/09/21 13:52 Dose:  5 mg


   Documented by: 


Prochlorperazine Edisylate (Prochlorperazine 10 Mg/2 Ml Vial)  10 mg IVP Q6HR 

PRN


   PRN Reason: Nausea / Vomiting


Promethazine HCl (Promethazine 25 Mg/1 Ml Vial)  25 mg IM Q6HR PRN


   PRN Reason: Nausea / Vomiting


Sodium Chloride (Sodium Chloride Flush 0.9% 10 Ml Syringe)  10 ml IVP PRN PRN


   PRN Reason: AS NEEDED PER PROVIDER ORDERS


Sodium Chloride (Sodium Chloride Flush 0.9% 10 Ml Syringe)  10 ml IVP 

0100,0900,1700 [facility]


   Last Admin: 12/09/21 17:41 Dose:  Not Given


   Documented by: 


Zinc Sulfate (Zinc Sulfate 220 Mg Capsule)  220 mg PO DAILY [facility]


   Stop: 12/17/21 09:00


   Last Admin: 12/09/21 13:51 Dose:  220 mg


   Documented by: 





                                        





No Known Home Medications  12/09/21 











Objective





- Vital Signs/Intake & Output


Reviewed Vital Signs: Yes


Vital Signs: 


                                Vital Signs x48h











  Temp Pulse Resp BP Pulse Ox


 


 12/09/21 16:00  36.5 C  52 L  18  142/84 H  97











Intake & Output: 


                                 Intake & Output











 12/06/21 12/07/21 12/08/21 12/09/21





 23:59 23:59 23:59 23:59


 


Intake Total   2693.333 1790


 


Output Total    1100


 


Balance   2693.333 690














- Objective


General Appearance: positive: Alert, Mild distress, Other (fatigued appearing 

slender  female, gets mod weller sitting up to get to bedside commode)


Eyes Bilateral: positive: PERRL, EOMI


ENT: positive: No signs of dehydration


Neck: positive: No JVD.  negative: Stiff neck


Respiratory: negative: Wheezes, Rales, Rhonchi


Cardiovascular: positive: Regular rate & rhythm, No murmur, No gallop


Abdomen: positive: Non-tender, No organomegaly, Nml bowel sounds, No distention


Skin: positive: Warm, Dry


Extremities: positive: Non-tender, Full ROM, No pedal edema


Neurologic/Psychiatric: positive: Oriented x3, CN's nml (2-12), Motor nml, 

Sensation nml





- Lab Results


Fish Bones: 


                                 12/09/21 04:55





                                 12/09/21 04:55


Other Labs: 


                               Lab Results x24hrs











  12/09/21 12/09/21 Range/Units





  04:55 04:55 


 


WBC   8.5  (4.8-10.8)  x10^3/uL


 


RBC   4.44  (4.20-5.40)  10^6/uL


 


Hgb   12.3  (12.0-16.0)  g/dL


 


Hct   36.7 L  (37.0-47.0)  %


 


MCV   82.7  (81.0-99.0)  fL


 


MCH   27.7  (27.0-31.0)  pg


 


MCHC   33.5  (32.0-36.0)  g/dL


 


RDW   12.3  (12.0-15.0)  %


 


Plt Count   369  (130-450)  10^3/uL


 


MPV   9.1  (7.9-10.8)  fL


 


Neut # (Auto)   5.8  (1.5-6.6)  10^3/uL


 


Lymph # (Auto)   1.4 L  (1.5-3.5)  10^3/uL


 


Mono # (Auto)   1.0  (0.0-1.0)  10^3/uL


 


Eos # (Auto)   0.0  (0.0-0.7)  10^3/uL


 


Baso # (Auto)   0.0  (0.0-0.1)  10^3/uL


 


Absolute Nucleated RBC   0.00  x10^3/uL


 


Band Neuts % (Manual)   Not Reportable  


 


Abnorm Lymph % (Manual)   Not Reportable  


 


Nucleated RBC %   0.0  /100WBC


 


Neutrophils # (Manual)   Not Reportable  


 


Lymphocytes # (Manual)   Not Reportable  


 


Monocytes # (Manual)   Not Reportable  


 


Eosinophils # (Manual)   Not Reportable  


 


Basophils # (Manual)   Not Reportable  


 


Differential Comment   MANUAL=AUTO DIFF  


 


WBC Morphology   NORMAL APPEARANCE  (NORMAL)  


 


Platelet Estimate   NORMAL (130-450,000)  (NORMAL)  


 


Platelet Morphology   NORMAL APPEARANCE  (NORMAL)  


 


RBC Morph Micro Appear   NORMAL APPEARANCE  (NORMAL)  


 


Sodium  139   (135-145)  mmol/L


 


Potassium  3.6   (3.5-5.0)  mmol/L


 


Chloride  103   (101-111)  mmol/L


 


Carbon Dioxide  24   (21-32)  mmol/L


 


Anion Gap  12.0   (6-13)  


 


BUN  7   (6-20)  mg/dL


 


Creatinine  0.4   (0.4-1.0)  mg/dL


 


Estimated GFR (MDRD)  170   (>89)  


 


Glucose  124 H   ()  mg/dL


 


Calcium  8.2 L   (8.5-10.3)  mg/dL














Assessment/Plan





- Problem List


(1) Acute respiratory failure with hypoxia


Impression: 


Due to bilateral pneumonia which is due to Covid.  Treatment will be dressed 

below.  At this time the patient will be placed on nasal cannula.  If need be we

will transition to high flow nasal cannula then Oxymizer then intubation.


Day 1>>NC>oxymizer by midnight


Day 2>>40 liters HFNC with fi02 50% by 1 am. Stable all day with this. 








(2) Pneumonia due to COVID-19 virus


Conclusion/Plan: 


We will start remdesivir.  Today is day 2/5.


We will start Decadron.  Today is day 2/10.


DVT prophylaxis will be with Eliquis 2.5 mg p.o. twice daily. But pharmacy would

like to go back to [facility]. 


Watch for symptoms of secondary pneumonia

## 2021-12-09 NOTE — PHARMACY PROGRESS NOTE
- Best Possible Medication History


Admit Date and Time: 12/08/21 1204


Processed by: Pharmacy


Medication History completed: Yes


Patient Interview: Pt interview ONLY source





As the person ultimately responsible for medication therapy, providers are able 

to order a medication from an existing home medication list in [facility] via the 

"Reconcile Routine" prior to Confirmation of that medication by support staff. 

Such practice is discouraged except when the physician, in their clinical 

judgment, deems that a medical need exists for a medication without regard to 

previous use.

## 2021-12-10 LAB
ANION GAP SERPL CALCULATED.4IONS-SCNC: 13 MMOL/L (ref 6–13)
BASOPHILS # BLD MANUAL: 0 10^3/UL (ref 0–0.1)
BASOPHILS NFR BLD AUTO: 0.4 %
BUN SERPL-MCNC: 10 MG/DL (ref 6–20)
CALCIUM UR-MCNC: 8.7 MG/DL (ref 8.5–10.3)
CHLORIDE SERPL-SCNC: 103 MMOL/L (ref 101–111)
CO2 SERPL-SCNC: 25 MMOL/L (ref 21–32)
CREAT SERPLBLD-SCNC: 0.6 MG/DL (ref 0.4–1)
EOSINOPHIL # BLD MANUAL: 0 10^3/UL (ref 0–0.7)
EOSINOPHIL NFR BLD AUTO: 0.5 %
ERYTHROCYTE [DISTWIDTH] IN BLOOD BY AUTOMATED COUNT: 12.3 % (ref 12–15)
GFRSERPLBLD MDRD-ARVRAT: 106 ML/MIN/{1.73_M2} (ref 89–?)
GLUCOSE SERPL-MCNC: 106 MG/DL (ref 70–100)
HCT VFR BLD AUTO: 38.9 % (ref 37–47)
HGB UR QL STRIP: 13 G/DL (ref 12–16)
LYMPH ABN NFR BLD MANUAL: 2 %
LYMPHOBLASTS # BLD: 18 %
LYMPHOCYTES # BLD MANUAL: 2.9 10^3/UL (ref 1.5–3.5)
LYMPHOCYTES NFR BLD AUTO: 13.8 %
MANUAL DIF COMMENT BLD-IMP: (no result)
MCH RBC QN AUTO: 27.6 PG (ref 27–31)
MCHC RBC AUTO-ENTMCNC: 33.4 G/DL (ref 32–36)
MCV RBC AUTO: 82.6 FL (ref 81–99)
MONOCYTES # BLD MANUAL: 0.9 10^3/UL (ref 0–1)
MONOCYTES NFR BLD AUTO: 7.8 %
NEUTROPHILS # SNV AUTO: 14.7 X10^3/UL (ref 4.8–10.8)
NEUTROPHILS NFR BLD AUTO: 74.1 %
NEUTROPHILS NFR BLD MANUAL: 10.9 10^3/UL (ref 1.5–6.6)
NEUTS BAND NFR BLD: 5 %
PDW BLD AUTO: 9.2 FL (ref 7.9–10.8)
PLAT MORPH BLD: (no result)
PLATELET # BLD: 456 10^3/UL (ref 130–450)
PLATELET BLD QL SMEAR: (no result)
POTASSIUM SERPL-SCNC: 3.6 MMOL/L (ref 3.5–5)
RBC MAR: 4.71 10^6/UL (ref 4.2–5.4)
RBC MORPH BLD: (no result)
SODIUM SERPLBLD-SCNC: 141 MMOL/L (ref 135–145)
WBC MORPH BLD: (no result)

## 2021-12-10 RX ADMIN — Medication SCH MG: at 08:18

## 2021-12-10 RX ADMIN — Medication SCH MCG: at 08:18

## 2021-12-10 RX ADMIN — GUAIFENESIN PRN MG: 100 SOLUTION ORAL at 08:18

## 2021-12-10 RX ADMIN — SODIUM CHLORIDE, PRESERVATIVE FREE SCH ML: 5 INJECTION INTRAVENOUS at 22:47

## 2021-12-10 RX ADMIN — BENZONATATE PRN MG: 100 CAPSULE ORAL at 08:17

## 2021-12-10 RX ADMIN — DEXAMETHASONE SODIUM PHOSPHATE SCH MG: 10 INJECTION, SOLUTION INTRAMUSCULAR; INTRAVENOUS at 08:17

## 2021-12-10 RX ADMIN — SODIUM CHLORIDE, PRESERVATIVE FREE SCH ML: 5 INJECTION INTRAVENOUS at 08:19

## 2021-12-10 RX ADMIN — OXYCODONE HYDROCHLORIDE AND ACETAMINOPHEN SCH MG: 500 TABLET ORAL at 08:18

## 2021-12-10 RX ADMIN — ACETAMINOPHEN PRN MG: 325 TABLET ORAL at 08:19

## 2021-12-10 RX ADMIN — GUAIFENESIN PRN MG: 100 SOLUTION ORAL at 22:52

## 2021-12-10 RX ADMIN — APIXABAN SCH MG: 2.5 TABLET, FILM COATED ORAL at 22:46

## 2021-12-10 RX ADMIN — SODIUM CHLORIDE SCH MLS/HR: 9 INJECTION, SOLUTION INTRAVENOUS at 09:29

## 2021-12-10 RX ADMIN — BENZONATATE PRN MG: 100 CAPSULE ORAL at 01:04

## 2021-12-10 RX ADMIN — SODIUM CHLORIDE, PRESERVATIVE FREE SCH: 5 INJECTION INTRAVENOUS at 22:46

## 2021-12-10 RX ADMIN — APIXABAN SCH MG: 2.5 TABLET, FILM COATED ORAL at 08:18

## 2021-12-10 NOTE — PROVIDER PROGRESS NOTE
Subjective





- Prog Note Date


Prog Note Date: 12/10/21


Prog Note Time: 08:27





- Subjective


Pt reports feeling: No change


Subjective: 





stable and no increased requirement for 02.  if anything she is needing less. 

Chest discomfort is stable. No worse. Central burning tightness that is all over

both sides and hurts to breath. 





Current Medications





- Current Medications


Current Medications: 





Active Medications





Acetaminophen (Acetaminophen 325 Mg Tablet)  650 mg PO Q4HR PRN


   PRN Reason: Pain 1 to 4


   Last Admin: 12/10/21 08:19 Dose:  650 mg


   Documented by: 


Apixaban (Apixaban 2.5 Mg Tablet)  2.5 mg PO BID [facility]


   Last Admin: 12/10/21 08:18 Dose:  2.5 mg


   Documented by: 


Ascorbic Acid (Ascorbic Acid 500 Mg Tablet)  500 mg PO DAILY [facility]


   Last Admin: 12/10/21 08:18 Dose:  500 mg


   Documented by: 


Benzonatate (Benzonatate 100 Mg Capsule)  100 mg PO TID PRN


   PRN Reason: Cough


   Last Admin: 12/10/21 08:17 Dose:  100 mg


   Documented by: 


Cholecalciferol (Cholecalciferol 25 Mcg Tablet)  50 mcg PO DAILY [facility]


   Last Admin: 12/10/21 08:18 Dose:  50 mcg


   Documented by: 


Dexamethasone (Dexamethasone 10 Mg/Ml Vial)  10 mg IVP DAILY [facility]


   Last Admin: 12/10/21 08:17 Dose:  10 mg


   Documented by: 


Guaifenesin (Guaifenesin 100 Mg/5 Ml Udc)  100 mg PO Q6HR PRN


   PRN Reason: Cough


   Last Admin: 12/10/21 08:18 Dose:  100 mg


   Documented by: 


Remdesivir 100 mg/ Sodium (Chloride)  100 mls @ 200 mls/hr IV DAILY [facility]


   Stop: 12/12/21 09:29


   Last Infusion: 12/10/21 10:05 Dose:  Infused


   Documented by: 


Ondansetron HCl (Ondansetron Odt 4 Mg Tablet)  4 mg TL Q6HR PRN


   PRN Reason: Nausea / Vomiting


   Last Admin: 12/08/21 17:21 Dose:  4 mg


   Documented by: 


Oxycodone HCl (Oxycodone 5 Mg Tablet)  5 mg PO Q4HR PRN


   PRN Reason: Pain 5 to 7


   Last Admin: 12/09/21 20:42 Dose:  5 mg


   Documented by: 


Prochlorperazine Edisylate (Prochlorperazine 10 Mg/2 Ml Vial)  10 mg IVP Q6HR 

PRN


   PRN Reason: Nausea / Vomiting


Promethazine HCl (Promethazine 25 Mg/1 Ml Vial)  25 mg IM Q6HR PRN


   PRN Reason: Nausea / Vomiting


Sodium Chloride (Sodium Chloride Flush 0.9% 10 Ml Syringe)  10 ml IVP PRN PRN


   PRN Reason: AS NEEDED PER PROVIDER ORDERS


Sodium Chloride (Sodium Chloride Flush 0.9% 10 Ml Syringe)  10 ml IVP 

0100,0900,1700 [facility]


   Last Admin: 12/10/21 08:19 Dose:  10 ml


   Documented by: 


Zinc Sulfate (Zinc Sulfate 220 Mg Capsule)  220 mg PO DAILY [facility]


   Stop: 12/17/21 09:00


   Last Admin: 12/10/21 08:18 Dose:  220 mg


   Documented by: 





                                        





No Known Home Medications  12/09/21 











Objective





- Vital Signs/Intake & Output


Reviewed Vital Signs: Yes


Vital Signs: 


                                Vital Signs x48h











  Temp Pulse Resp BP Pulse Ox


 


 12/10/21 07:25  36.8 C  63  20  124/84 H  93


 


 12/10/21 06:30  36.6 C  57 L  16  144/83 H  90 L


 


 12/10/21 03:39   51 L    93











Intake & Output: 


                                 Intake & Output











 12/07/21 12/08/21 12/09/21 12/10/21





 23:59 23:59 23:59 23:59


 


Intake Total  2693.333 2090 


 


Output Total   1100 


 


Balance  2693.333 990 














- Objective


General Appearance: positive: Alert, Mild distress


Eyes Bilateral: positive: PERRL, EOMI


ENT: positive: No signs of dehydration


Neck: positive: No JVD.  negative: Stiff neck


Respiratory: positive: Rales.  negative: Wheezes, Rhonchi


Cardiovascular: positive: Regular rate & rhythm, No murmur, No gallop


Abdomen: positive: Non-tender, No organomegaly, Nml bowel sounds, No distention


Skin: positive: Warm, Dry, Pallor


Extremities: positive: Full ROM, No pedal edema


Neurologic/Psychiatric: positive: Oriented x3, CN's nml (2-12), Motor nml





- Lab Results


Fish Bones: 


                                 12/10/21 05:19





                                 12/10/21 05:19


Other Labs: 


                               Lab Results x24hrs











  12/10/21 12/10/21 Range/Units





  05:19 05:19 


 


WBC   14.7 H  (4.8-10.8)  x10^3/uL


 


RBC   4.71  (4.20-5.40)  10^6/uL


 


Hgb   13.0  (12.0-16.0)  g/dL


 


Hct   38.9  (37.0-47.0)  %


 


MCV   82.6  (81.0-99.0)  fL


 


MCH   27.6  (27.0-31.0)  pg


 


MCHC   33.4  (32.0-36.0)  g/dL


 


RDW   12.3  (12.0-15.0)  %


 


Plt Count   456 H  (130-450)  10^3/uL


 


MPV   9.2  (7.9-10.8)  fL


 


Neut # (Auto)   Not Reportable  


 


Lymph # (Auto)   Not Reportable  


 


Mono # (Auto)   Not Reportable  


 


Eos # (Auto)   Not Reportable  


 


Baso # (Auto)   Not Reportable  


 


Absolute Nucleated RBC   Not Reportable  


 


Total Counted   100  


 


Band Neuts % (Manual)   5 (0 - 10) %


 


Abnorm Lymph % (Manual)   2  %


 


Nucleated RBC %   Not Reportable  


 


Neutrophils # (Manual)   10.9 H  (1.5-6.6)  10^3/uL


 


Lymphocytes # (Manual)   2.9  (1.5-3.5)  10^3/uL


 


Monocytes # (Manual)   0.9  (0.0-1.0)  10^3/uL


 


Eosinophils # (Manual)   0.0  (0-0.7)  10^3/uL


 


Basophils # (Manual)   0.0  (0-0.1)  10^3/uL


 


Differential Comment   MANUAL DIFFERENTIAL  


 


WBC Morphology   NORMAL APPEARANCE  (NORMAL)  


 


Platelet Estimate   NORMAL (130-450,000)  (NORMAL)  


 


Platelet Morphology   NORMAL APPEARANCE  (NORMAL)  


 


RBC Morph Micro Appear   NORMAL APPEARANCE  (NORMAL)  


 


Sodium  141   (135-145)  mmol/L


 


Potassium  3.6   (3.5-5.0)  mmol/L


 


Chloride  103   (101-111)  mmol/L


 


Carbon Dioxide  25   (21-32)  mmol/L


 


Anion Gap  13.0   (6-13)  


 


BUN  10   (6-20)  mg/dL


 


Creatinine  0.6   (0.4-1.0)  mg/dL


 


Estimated GFR (MDRD)  106   (>89)  


 


Glucose  106 H   ()  mg/dL


 


Calcium  8.7   (8.5-10.3)  mg/dL














Assessment/Plan





- Problem List


(1) Acute respiratory failure with hypoxia


Impression: 


Due to bilateral pneumonia which is due to Covid.  Treatment will be dressed 

below.  At this time the patient will be placed on nasal cannula.  If need be we

will transition to high flow nasal cannula then Oxymizer then intubation.


Day 1>>NC>oxymizer by midnight


Day 2>>40 liters HFNC with fi02 50% by 1 am. Stable all day with this. Then 

dropped to 35%, 35 liters and stable 93%


Day 3>>35 liters HFNC with fi02 35% , 35 liters. 94%








(2) Pneumonia due to COVID-19 virus


Conclusion/Plan: 


We will start remdesivir.  Today is day 3/5.


We will start Decadron.  Today is day 3/10.


DVT prophylaxis will be with Eliquis 2.5 mg p.o. twice daily.


Watch for symptoms of secondary pneumonia

## 2021-12-11 LAB
ANION GAP SERPL CALCULATED.4IONS-SCNC: 12 MMOL/L (ref 6–13)
BASOPHILS # BLD MANUAL: 0 10^3/UL (ref 0–0.1)
BASOPHILS NFR BLD AUTO: 0.5 %
BUN SERPL-MCNC: 11 MG/DL (ref 6–20)
CALCIUM UR-MCNC: 8.3 MG/DL (ref 8.5–10.3)
CHLORIDE SERPL-SCNC: 103 MMOL/L (ref 101–111)
CO2 SERPL-SCNC: 26 MMOL/L (ref 21–32)
CREAT SERPLBLD-SCNC: 0.5 MG/DL (ref 0.4–1)
EOSINOPHIL # BLD MANUAL: 0.2 10^3/UL (ref 0–0.7)
EOSINOPHIL NFR BLD AUTO: 0.1 %
ERYTHROCYTE [DISTWIDTH] IN BLOOD BY AUTOMATED COUNT: 12.2 % (ref 12–15)
GFRSERPLBLD MDRD-ARVRAT: 131 ML/MIN/{1.73_M2} (ref 89–?)
GLUCOSE SERPL-MCNC: 89 MG/DL (ref 70–100)
HCT VFR BLD AUTO: 38.3 % (ref 37–47)
HGB UR QL STRIP: 12.7 G/DL (ref 12–16)
LYMPH ABN NFR BLD MANUAL: 0 %
LYMPHOBLASTS # BLD: 12 %
LYMPHOCYTES # BLD MANUAL: 2.1 10^3/UL (ref 1.5–3.5)
LYMPHOCYTES NFR BLD AUTO: 12.5 %
MANUAL DIF COMMENT BLD-IMP: (no result)
MCH RBC QN AUTO: 27.4 PG (ref 27–31)
MCHC RBC AUTO-ENTMCNC: 33.2 G/DL (ref 32–36)
MCV RBC AUTO: 82.5 FL (ref 81–99)
MONOCYTES # BLD MANUAL: 1.4 10^3/UL (ref 0–1)
MONOCYTES NFR BLD AUTO: 5.5 %
NEUTROPHILS # SNV AUTO: 17.3 X10^3/UL (ref 4.8–10.8)
NEUTROPHILS NFR BLD AUTO: 76.2 %
NEUTROPHILS NFR BLD MANUAL: 13.7 10^3/UL (ref 1.5–6.6)
NEUTS BAND NFR BLD: 0 %
PDW BLD AUTO: 9 FL (ref 7.9–10.8)
PLAT MORPH BLD: (no result)
PLATELET # BLD: 426 10^3/UL (ref 130–450)
PLATELET BLD QL SMEAR: (no result)
POTASSIUM SERPL-SCNC: 3.1 MMOL/L (ref 3.5–5)
RBC MAR: 4.64 10^6/UL (ref 4.2–5.4)
RBC MORPH BLD: (no result)
SODIUM SERPLBLD-SCNC: 141 MMOL/L (ref 135–145)

## 2021-12-11 RX ADMIN — SODIUM CHLORIDE SCH MLS/HR: 9 INJECTION, SOLUTION INTRAVENOUS at 08:49

## 2021-12-11 RX ADMIN — ACETAMINOPHEN PRN MG: 325 TABLET ORAL at 08:50

## 2021-12-11 RX ADMIN — Medication SCH MG: at 08:50

## 2021-12-11 RX ADMIN — SODIUM CHLORIDE SCH MLS/HR: 9 INJECTION, SOLUTION INTRAVENOUS at 17:12

## 2021-12-11 RX ADMIN — BENZONATATE PRN MG: 100 CAPSULE ORAL at 08:50

## 2021-12-11 RX ADMIN — OXYCODONE PRN MG: 5 TABLET ORAL at 00:20

## 2021-12-11 RX ADMIN — SODIUM CHLORIDE, PRESERVATIVE FREE SCH ML: 5 INJECTION INTRAVENOUS at 08:49

## 2021-12-11 RX ADMIN — DEXTROSE MONOHYDRATE SCH MLS/HR: 50 INJECTION, SOLUTION INTRAVENOUS at 14:08

## 2021-12-11 RX ADMIN — Medication SCH MCG: at 08:48

## 2021-12-11 RX ADMIN — DEXAMETHASONE SODIUM PHOSPHATE SCH MG: 10 INJECTION, SOLUTION INTRAMUSCULAR; INTRAVENOUS at 08:48

## 2021-12-11 RX ADMIN — OXYCODONE PRN MG: 5 TABLET ORAL at 08:50

## 2021-12-11 RX ADMIN — OXYCODONE HYDROCHLORIDE AND ACETAMINOPHEN SCH MG: 500 TABLET ORAL at 08:48

## 2021-12-11 RX ADMIN — OXYCODONE PRN MG: 5 TABLET ORAL at 17:20

## 2021-12-11 RX ADMIN — APIXABAN SCH MG: 2.5 TABLET, FILM COATED ORAL at 20:57

## 2021-12-11 RX ADMIN — GUAIFENESIN PRN MG: 100 SOLUTION ORAL at 17:20

## 2021-12-11 RX ADMIN — SODIUM CHLORIDE, PRESERVATIVE FREE SCH ML: 5 INJECTION INTRAVENOUS at 17:12

## 2021-12-11 RX ADMIN — APIXABAN SCH MG: 2.5 TABLET, FILM COATED ORAL at 08:47

## 2021-12-11 RX ADMIN — GUAIFENESIN PRN MG: 100 SOLUTION ORAL at 08:50

## 2021-12-11 RX ADMIN — BENZONATATE PRN MG: 100 CAPSULE ORAL at 17:20

## 2021-12-11 NOTE — PROVIDER PROGRESS NOTE
Subjective





- Prog Note Date


Prog Note Date: 12/11/21


Prog Note Time: 11:44





- Subjective


Pt reports feeling: Improved


Subjective: 





She is requiring less oxygen today.  That is great news for her.  She is up 

ambulating in the room.  Just feels wiped out and fatigued.  Cough continues.  

Minimal phlegm production.  No fevers.





Current Medications





- Current Medications


Current Medications: 





Active Medications





Acetaminophen (Acetaminophen 325 Mg Tablet)  650 mg PO Q4HR PRN


   PRN Reason: Pain 1 to 4


   Last Admin: 12/11/21 08:50 Dose:  650 mg


   Documented by: 


Apixaban (Apixaban 2.5 Mg Tablet)  2.5 mg PO BID [facility]


   Last Admin: 12/11/21 08:47 Dose:  2.5 mg


   Documented by: 


Ascorbic Acid (Ascorbic Acid 500 Mg Tablet)  500 mg PO DAILY [facility]


   Last Admin: 12/11/21 08:48 Dose:  500 mg


   Documented by: 


Benzonatate (Benzonatate 100 Mg Capsule)  100 mg PO TID PRN


   PRN Reason: Cough


   Last Admin: 12/11/21 08:50 Dose:  100 mg


   Documented by: 


Cholecalciferol (Cholecalciferol 25 Mcg Tablet)  50 mcg PO DAILY [facility]


   Last Admin: 12/11/21 08:48 Dose:  50 mcg


   Documented by: 


Dexamethasone (Dexamethasone 10 Mg/Ml Vial)  10 mg IVP DAILY [facility]


   Last Admin: 12/11/21 08:48 Dose:  10 mg


   Documented by: 


Guaifenesin (Guaifenesin 100 Mg/5 Ml Udc)  100 mg PO Q6HR PRN


   PRN Reason: Cough


   Last Admin: 12/11/21 08:50 Dose:  100 mg


   Documented by: 


Remdesivir 100 mg/ Sodium (Chloride)  100 mls @ 200 mls/hr IV DAILY [facility]


   Stop: 12/12/21 09:29


   Last Admin: 12/11/21 08:49 Dose:  200 mls/hr


   Documented by: 


Ondansetron HCl (Ondansetron Odt 4 Mg Tablet)  4 mg TL Q6HR PRN


   PRN Reason: Nausea / Vomiting


   Last Admin: 12/08/21 17:21 Dose:  4 mg


   Documented by: 


Oxycodone HCl (Oxycodone 5 Mg Tablet)  5 mg PO Q4HR PRN


   PRN Reason: Pain 5 to 7


   Last Admin: 12/11/21 08:50 Dose:  5 mg


   Documented by: 


Prochlorperazine Edisylate (Prochlorperazine 10 Mg/2 Ml Vial)  10 mg IVP Q6HR 

PRN


   PRN Reason: Nausea / Vomiting


Promethazine HCl (Promethazine 25 Mg/1 Ml Vial)  25 mg IM Q6HR PRN


   PRN Reason: Nausea / Vomiting


Sodium Chloride (Sodium Chloride Flush 0.9% 10 Ml Syringe)  10 ml IVP PRN PRN


   PRN Reason: AS NEEDED PER PROVIDER ORDERS


Sodium Chloride (Sodium Chloride Flush 0.9% 10 Ml Syringe)  10 ml IVP 

0100,0900,1700 [facility]


   Last Admin: 12/11/21 08:49 Dose:  10 ml


   Documented by: 


Zinc Sulfate (Zinc Sulfate 220 Mg Capsule)  220 mg PO DAILY [facility]


   Stop: 12/17/21 09:00


   Last Admin: 12/11/21 08:50 Dose:  220 mg


   Documented by: 





                                        





No Known Home Medications  12/09/21 











Objective





- Vital Signs/Intake & Output


Reviewed Vital Signs: Yes


Vital Signs: 


                                Vital Signs x48h











  Temp Pulse Resp BP Pulse Ox


 


 12/11/21 11:10  36.6 C  58 L  20  104/56 L  96


 


 12/11/21 08:00  36.7 C  65  20  113/72  93


 


 12/11/21 05:21  36.9 C  57 L  20  122/75  88 L











Intake & Output: 


                                 Intake & Output











 12/08/21 12/09/21 12/10/21 12/11/21





 23:59 23:59 23:59 23:59


 


Intake Total 2693.333 2090 1500 680


 


Output Total  1100  


 


Balance 2693. 680














- Objective


General Appearance: positive: No acute distress, Alert (But very fatigued 

appearing. She says she is just exhausted.)


Eyes Bilateral: positive: PERRL, EOMI


ENT: positive: Pharynx nml, No signs of dehydration


Neck: positive: No JVD.  negative: Stiff neck


Respiratory: positive: No respiratory distress.  negative: Wheezes, Rales, 

Rhonchi


Cardiovascular: positive: Regular rate & rhythm.  negative: Gallop/S4, Friction 

rub


Abdomen: positive: Non-tender, No organomegaly, Nml bowel sounds, No distention


Skin: positive: Warm, Dry


Extremities: positive: Full ROM, No pedal edema


Neurologic/Psychiatric: positive: Oriented x3, CN's nml (2-12), Motor nml





- Lab Results


Fish Bones: 


                                 12/11/21 05:08





                                 12/11/21 05:08


Other Labs: 


                               Lab Results x24hrs











  12/11/21 12/11/21 Range/Units





  05:08 05:08 


 


WBC   17.3 H  (4.8-10.8)  x10^3/uL


 


RBC   4.64  (4.20-5.40)  10^6/uL


 


Hgb   12.7  (12.0-16.0)  g/dL


 


Hct   38.3  (37.0-47.0)  %


 


MCV   82.5  (81.0-99.0)  fL


 


MCH   27.4  (27.0-31.0)  pg


 


MCHC   33.2  (32.0-36.0)  g/dL


 


RDW   12.2  (12.0-15.0)  %


 


Plt Count   426  (130-450)  10^3/uL


 


MPV   9.0  (7.9-10.8)  fL


 


Neut # (Auto)   Not Reportable  


 


Lymph # (Auto)   Not Reportable  


 


Mono # (Auto)   Not Reportable  


 


Eos # (Auto)   Not Reportable  


 


Baso # (Auto)   Not Reportable  


 


Absolute Nucleated RBC   Not Reportable  


 


Total Counted   100  


 


Band Neuts % (Manual)   0 (0 - 10) %


 


Abnorm Lymph % (Manual)   0  %


 


Nucleated RBC %   Not Reportable  


 


Neutrophils # (Manual)   13.7 H  (1.5-6.6)  10^3/uL


 


Lymphocytes # (Manual)   2.1  (1.5-3.5)  10^3/uL


 


Monocytes # (Manual)   1.4 H  (0.0-1.0)  10^3/uL


 


Eosinophils # (Manual)   0.2  (0-0.7)  10^3/uL


 


Basophils # (Manual)   0.0  (0-0.1)  10^3/uL


 


Differential Comment   MANUAL DIFFERENTIAL  


 


Platelet Estimate   INCREASED (>450,000)  (NORMAL)  


 


Platelet Morphology   NORMAL APPEARANCE  (NORMAL)  


 


RBC Morph Micro Appear   NORMAL APPEARANCE  (NORMAL)  


 


Sodium  141   (135-145)  mmol/L


 


Potassium  3.1 L   (3.5-5.0)  mmol/L


 


Chloride  103   (101-111)  mmol/L


 


Carbon Dioxide  26   (21-32)  mmol/L


 


Anion Gap  12.0   (6-13)  


 


BUN  11   (6-20)  mg/dL


 


Creatinine  0.5   (0.4-1.0)  mg/dL


 


Estimated GFR (MDRD)  131   (>89)  


 


Glucose  89   ()  mg/dL


 


Calcium  8.3 L   (8.5-10.3)  mg/dL














Assessment/Plan





- Problem List


(1) Acute respiratory failure with hypoxia


Impression: 


Due to bilateral pneumonia which is due to Covid.  Treatment will be dressed 

below.  At this time the patient will be placed on nasal cannula.  If need be we

will transition to high flow nasal cannula then Oxymizer then intubation.


Day 1>>NC>oxymizer by midnight


Day 2>>40 liters HFNC with fi02 50% by 1 am. Stable all day with this. Then 

dropped to 35%, 35 liters and stable 93%


Day 3>>35 liters HFNC with fi02 35% , 35 liters. 94%. 10 L by 9 p.m. with FiO2 

35%.


Day 3>>10 liters HFNC with fio2 35%. She is 88-93% sat








(2) Pneumonia due to COVID-19 virus


Conclusion/Plan: 


We will start remdesivir.  Today is day 4/5.


We will start Decadron.  Today is day 4/10.


DVT prophylaxis will be with Eliquis 2.5 mg p.o. twice daily.


Watch for symptoms of secondary pneumonia

## 2021-12-12 LAB
ALBUMIN DIAFP-MCNC: 2.9 G/DL (ref 3.2–5.5)
ALP SERPL-CCNC: 50 IU/L (ref 42–121)
ALT SERPL W P-5'-P-CCNC: 40 IU/L (ref 10–60)
ANION GAP SERPL CALCULATED.4IONS-SCNC: 10 MMOL/L (ref 6–13)
AST SERPL W P-5'-P-CCNC: 32 IU/L (ref 10–42)
BASOPHILS # BLD MANUAL: 0 10^3/UL (ref 0–0.1)
BASOPHILS NFR BLD AUTO: 0.4 %
BILIRUB BLD-MCNC: 0.7 MG/DL (ref 0.2–1)
BILIRUB DIRECT SERPL-MCNC: 0.2 MG/DL (ref 0.1–0.5)
BUN SERPL-MCNC: 10 MG/DL (ref 6–20)
CALCIUM UR-MCNC: 8.4 MG/DL (ref 8.5–10.3)
CHLORIDE SERPL-SCNC: 103 MMOL/L (ref 101–111)
CO2 SERPL-SCNC: 26 MMOL/L (ref 21–32)
CREAT SERPLBLD-SCNC: 0.5 MG/DL (ref 0.4–1)
EOSINOPHIL # BLD MANUAL: 0 10^3/UL (ref 0–0.7)
EOSINOPHIL NFR BLD AUTO: 0.2 %
ERYTHROCYTE [DISTWIDTH] IN BLOOD BY AUTOMATED COUNT: 12.3 % (ref 12–15)
GFRSERPLBLD MDRD-ARVRAT: 131 ML/MIN/{1.73_M2} (ref 89–?)
GLOBULIN SER-MCNC: 3.3 G/DL (ref 2.1–4.2)
GLUCOSE SERPL-MCNC: 99 MG/DL (ref 70–100)
HCT VFR BLD AUTO: 37.7 % (ref 37–47)
HGB UR QL STRIP: 12.4 G/DL (ref 12–16)
LYMPH ABN NFR BLD MANUAL: 0 %
LYMPHOBLASTS # BLD: 10 %
LYMPHOCYTES # BLD MANUAL: 1.6 10^3/UL (ref 1.5–3.5)
LYMPHOCYTES NFR BLD AUTO: 10.1 %
MANUAL DIF COMMENT BLD-IMP: (no result)
MCH RBC QN AUTO: 27 PG (ref 27–31)
MCHC RBC AUTO-ENTMCNC: 32.9 G/DL (ref 32–36)
MCV RBC AUTO: 82.1 FL (ref 81–99)
MONOCYTES # BLD MANUAL: 1.1 10^3/UL (ref 0–1)
MONOCYTES NFR BLD AUTO: 7.6 %
NEUTROPHILS # SNV AUTO: 15.6 X10^3/UL (ref 4.8–10.8)
NEUTROPHILS NFR BLD AUTO: 76.8 %
NEUTROPHILS NFR BLD MANUAL: 12.9 10^3/UL (ref 1.5–6.6)
NEUTS BAND NFR BLD: 0 %
PDW BLD AUTO: 8.7 FL (ref 7.9–10.8)
PLAT MORPH BLD: (no result)
PLATELET # BLD: 331 10^3/UL (ref 130–450)
PLATELET BLD QL SMEAR: (no result)
POTASSIUM SERPL-SCNC: 3.5 MMOL/L (ref 3.5–5)
PROT SPEC-MCNC: 6.2 G/DL (ref 6.7–8.2)
RBC MAR: 4.59 10^6/UL (ref 4.2–5.4)
RBC MORPH BLD: (no result)
SODIUM SERPLBLD-SCNC: 139 MMOL/L (ref 135–145)
WBC MORPH BLD: (no result)

## 2021-12-12 RX ADMIN — SODIUM CHLORIDE, PRESERVATIVE FREE SCH ML: 5 INJECTION INTRAVENOUS at 08:31

## 2021-12-12 RX ADMIN — SODIUM CHLORIDE SCH MLS/HR: 9 INJECTION, SOLUTION INTRAVENOUS at 11:54

## 2021-12-12 RX ADMIN — OXYCODONE PRN MG: 5 TABLET ORAL at 01:25

## 2021-12-12 RX ADMIN — APIXABAN SCH MG: 2.5 TABLET, FILM COATED ORAL at 20:37

## 2021-12-12 RX ADMIN — OXYCODONE PRN MG: 5 TABLET ORAL at 16:53

## 2021-12-12 RX ADMIN — ACETAMINOPHEN PRN MG: 325 TABLET ORAL at 14:01

## 2021-12-12 RX ADMIN — BENZONATATE PRN MG: 100 CAPSULE ORAL at 16:53

## 2021-12-12 RX ADMIN — GUAIFENESIN PRN MG: 100 SOLUTION ORAL at 01:23

## 2021-12-12 RX ADMIN — SODIUM CHLORIDE, PRESERVATIVE FREE SCH ML: 5 INJECTION INTRAVENOUS at 01:26

## 2021-12-12 RX ADMIN — ACETAMINOPHEN PRN MG: 325 TABLET ORAL at 01:24

## 2021-12-12 RX ADMIN — GUAIFENESIN PRN MG: 100 SOLUTION ORAL at 16:53

## 2021-12-12 RX ADMIN — SODIUM CHLORIDE SCH MLS/HR: 9 INJECTION, SOLUTION INTRAVENOUS at 08:34

## 2021-12-12 RX ADMIN — DEXAMETHASONE SODIUM PHOSPHATE SCH MG: 10 INJECTION, SOLUTION INTRAMUSCULAR; INTRAVENOUS at 08:31

## 2021-12-12 RX ADMIN — ACETAMINOPHEN PRN MG: 325 TABLET ORAL at 20:37

## 2021-12-12 RX ADMIN — Medication SCH MG: at 08:31

## 2021-12-12 RX ADMIN — OXYCODONE HYDROCHLORIDE AND ACETAMINOPHEN SCH MG: 500 TABLET ORAL at 08:31

## 2021-12-12 RX ADMIN — BENZONATATE PRN MG: 100 CAPSULE ORAL at 01:25

## 2021-12-12 RX ADMIN — SODIUM CHLORIDE, PRESERVATIVE FREE SCH ML: 5 INJECTION INTRAVENOUS at 16:54

## 2021-12-12 RX ADMIN — Medication SCH MCG: at 08:31

## 2021-12-12 RX ADMIN — APIXABAN SCH MG: 2.5 TABLET, FILM COATED ORAL at 08:31

## 2021-12-12 RX ADMIN — DEXTROSE MONOHYDRATE SCH MLS/HR: 50 INJECTION, SOLUTION INTRAVENOUS at 10:38

## 2021-12-12 NOTE — PROVIDER PROGRESS NOTE
Subjective





- Prog Note Date


Prog Note Date: 12/12/21


Prog Note Time: 07:56





- Subjective


Subjective: 





Continues to be in high nasal cannula hypoxia.  Her FiO2 is 65%.  Yesterday she 

was 30%.  So she is increased oxygen requirement.  She is still at 35 L.  White 

cell count was elevated as high as 17.3 yesterday and today it is 15.6.  When 

she first came in she was relatively neutropenic and her last normal white cell 

count was December 9.  She then analy on December 10 and continued to rise on 

December 11.  No fever with this.  No change in phlegm with this.





In view of the elevated white cell count yesterday, I did start azithromycin and

ceftriaxone.





Current Medications





- Current Medications


Current Medications: 





Active Medications





Acetaminophen (Acetaminophen 325 Mg Tablet)  650 mg PO Q4HR PRN


   PRN Reason: Pain 1 to 4


   Last Admin: 12/12/21 01:24 Dose:  650 mg


   Documented by: 


Apixaban (Apixaban 2.5 Mg Tablet)  2.5 mg PO BID [facility]


   Last Admin: 12/11/21 20:57 Dose:  2.5 mg


   Documented by: 


Ascorbic Acid (Ascorbic Acid 500 Mg Tablet)  500 mg PO DAILY [facility]


   Last Admin: 12/11/21 08:48 Dose:  500 mg


   Documented by: 


Benzonatate (Benzonatate 100 Mg Capsule)  100 mg PO TID PRN


   PRN Reason: Cough


   Last Admin: 12/12/21 01:25 Dose:  100 mg


   Documented by: 


Cholecalciferol (Cholecalciferol 25 Mcg Tablet)  50 mcg PO DAILY [facility]


   Last Admin: 12/11/21 08:48 Dose:  50 mcg


   Documented by: 


Dexamethasone (Dexamethasone 10 Mg/Ml Vial)  10 mg IVP DAILY [facility]


   Last Admin: 12/11/21 08:48 Dose:  10 mg


   Documented by: 


Guaifenesin (Guaifenesin 100 Mg/5 Ml Udc)  100 mg PO Q6HR PRN


   PRN Reason: Cough


   Last Admin: 12/12/21 01:23 Dose:  100 mg


   Documented by: 


Remdesivir 100 mg/ Sodium (Chloride)  100 mls @ 200 mls/hr IV DAILY [facility]


   Stop: 12/12/21 09:29


   Last Infusion: 12/11/21 11:45 Dose:  Infused


   Documented by: 


Ceftriaxone Sodium 1 gm/ (Sodium Chloride)  100 mls @ 200 mls/hr IV DAILY [facility]


   Last Infusion: 12/11/21 14:45 Dose:  Infused


   Documented by: 


Azithromycin 500 mg/ Sodium (Chloride)  250 mls @ 250 mls/hr IV 1000 [facility]


   Stop: 12/13/21 10:59


   Last Infusion: 12/11/21 18:15 Dose:  Infused


   Documented by: 


Ondansetron HCl (Ondansetron Odt 4 Mg Tablet)  4 mg TL Q6HR PRN


   PRN Reason: Nausea / Vomiting


   Last Admin: 12/08/21 17:21 Dose:  4 mg


   Documented by: 


Oxycodone HCl (Oxycodone 5 Mg Tablet)  5 mg PO Q4HR PRN


   PRN Reason: Pain 5 to 7


   Last Admin: 12/12/21 01:25 Dose:  5 mg


   Documented by: 


Prochlorperazine Edisylate (Prochlorperazine 10 Mg/2 Ml Vial)  10 mg IVP Q6HR 

PRN


   PRN Reason: Nausea / Vomiting


Promethazine HCl (Promethazine 25 Mg/1 Ml Vial)  25 mg IM Q6HR PRN


   PRN Reason: Nausea / Vomiting


Sodium Chloride (Sodium Chloride Flush 0.9% 10 Ml Syringe)  10 ml IVP PRN PRN


   PRN Reason: AS NEEDED PER PROVIDER ORDERS


Sodium Chloride (Sodium Chloride Flush 0.9% 10 Ml Syringe)  10 ml IVP 

0100,0900,1700 [facility]


   Last Admin: 12/12/21 01:26 Dose:  10 ml


   Documented by: 


Zinc Sulfate (Zinc Sulfate 220 Mg Capsule)  220 mg PO DAILY [facility]


   Stop: 12/17/21 09:00


   Last Admin: 12/11/21 08:50 Dose:  220 mg


   Documented by: 





                                        





No Known Home Medications  12/09/21 











Objective





- Vital Signs/Intake & Output


Reviewed Vital Signs: Yes


Vital Signs: 


                                Vital Signs x48h











  Temp Pulse Resp BP Pulse Ox


 


 12/12/21 06:24  36.4 C L  52 L  18  133/86 H  95


 


 12/12/21 00:05  36.4 C L  58 L  18  161/95 H 











Intake & Output: 


                                 Intake & Output











 12/09/21 12/10/21 12/11/21 12/12/21





 23:59 23:59 23:59 23:59


 


Intake Total 2090 1500 1750 300


 


Output Total 1100   


 


Balance 990 1500 1750 300














- Objective


General Appearance: positive: No acute distress, Alert, Other (thin  

female, asleep but wakens easily and is alert, lucid speech)


Eyes Bilateral: positive: PERRL, EOMI


ENT: positive: No signs of dehydration


Neck: positive: No JVD.  negative: Stiff neck


Respiratory: positive: No respiratory distress.  negative: Wheezes, Rales, 

Rhonchi


Cardiovascular: positive: Regular rate & rhythm, No murmur, No gallop


Abdomen: positive: Non-tender, No organomegaly, Nml bowel sounds, No distention


Skin: positive: Warm, Dry


Extremities: positive: Non-tender, No pedal edema


Neurologic/Psychiatric: positive: Oriented x3, CN's nml (2-12), Motor nml





- Lab Results


Fish Bones: 


                                 12/12/21 05:35





                                 12/12/21 05:35


Other Labs: 


                               Lab Results x24hrs











  12/12/21 12/12/21 12/11/21 Range/Units





  05:35 05:35 05:08 


 


WBC   15.6 H   (4.8-10.8)  x10^3/uL


 


RBC   4.59   (4.20-5.40)  10^6/uL


 


Hgb   12.4   (12.0-16.0)  g/dL


 


Hct   37.7   (37.0-47.0)  %


 


MCV   82.1   (81.0-99.0)  fL


 


MCH   27.0   (27.0-31.0)  pg


 


MCHC   32.9   (32.0-36.0)  g/dL


 


RDW   12.3   (12.0-15.0)  %


 


Plt Count   331   (130-450)  10^3/uL


 


MPV   8.7   (7.9-10.8)  fL


 


Neut # (Auto)   Not Reportable  Not Reportable  


 


Lymph # (Auto)   Not Reportable  Not Reportable  


 


Mono # (Auto)   Not Reportable  Not Reportable  


 


Eos # (Auto)   Not Reportable  Not Reportable  


 


Baso # (Auto)   Not Reportable  Not Reportable  


 


Absolute Nucleated RBC   Not Reportable  Not Reportable  


 


Total Counted   100  100  


 


Band Neuts % (Manual)   0  0 (0 - 10) %


 


Abnorm Lymph % (Manual)   0  0  %


 


Nucleated RBC %   Not Reportable  Not Reportable  


 


Neutrophils # (Manual)   12.9 H  13.7 H  (1.5-6.6)  10^3/uL


 


Lymphocytes # (Manual)   1.6  2.1  (1.5-3.5)  10^3/uL


 


Monocytes # (Manual)   1.1 H  1.4 H  (0.0-1.0)  10^3/uL


 


Eosinophils # (Manual)   0.0  0.2  (0-0.7)  10^3/uL


 


Basophils # (Manual)   0.0  0.0  (0-0.1)  10^3/uL


 


Differential Comment   MANUAL DIFFERENTIAL  MANUAL DIFFERENTIAL  


 


WBC Morphology   NORMAL APPEARANCE   (NORMAL)  


 


Platelet Estimate   NORMAL (130-450,000)  INCREASED (>450,000)  (NORMAL)  


 


Platelet Morphology   NORMAL APPEARANCE  NORMAL APPEARANCE  (NORMAL)  


 


RBC Morph Micro Appear   NORMAL APPEARANCE  NORMAL APPEARANCE  (NORMAL)  


 


Sodium  139    (135-145)  mmol/L


 


Potassium  3.5    (3.5-5.0)  mmol/L


 


Chloride  103    (101-111)  mmol/L


 


Carbon Dioxide  26    (21-32)  mmol/L


 


Anion Gap  10.0    (6-13)  


 


BUN  10    (6-20)  mg/dL


 


Creatinine  0.5    (0.4-1.0)  mg/dL


 


Estimated GFR (MDRD)  131    (>89)  


 


Glucose  99    ()  mg/dL


 


Calcium  8.4 L    (8.5-10.3)  mg/dL


 


Total Bilirubin  0.7    (0.2-1.0)  mg/dL


 


Direct Bilirubin  0.2    (0.1-0.5)  mg/dL


 


AST  32    (10-42)  IU/L


 


ALT  40    (10-60)  IU/L


 


Alkaline Phosphatase  50    ()  IU/L


 


Total Protein  6.2 L    (6.7-8.2)  g/dL


 


Albumin  2.9 L    (3.2-5.5)  g/dL


 


Globulin  3.3    (2.1-4.2)  g/dL














ABX Reporting


Has patient been on IV antibiotics over the past 48 hours?: Yes





Assessment/Plan





- Problem List


(1) Acute respiratory failure with hypoxia


Impression: 


Due to bilateral pneumonia which is due to Covid.  Treatment will be dressed 

below.  At this time the patient will be placed on nasal cannula.  If need be we

will transition to high flow nasal cannula then Oxymizer then intubation.


Day 1>>NC>oxymizer by midnight


Day 2>>40 liters HFNC with fi02 50% by 1 am. Stable all day with this. Then 

dropped to 35%, 35 liters and stable 93%


Day 3>>35 liters HFNC with fi02 35% , 35 liters. 94%. 10 L by 9 p.m. with FiO2 

35%.


Day 3>>10 liters HFNC with fio2 35%. She is 88-93% sat


Day 4>>35 liters HFNC with fi02 65%. Down to fi02 40% with 35 liters and 02 sat 

94% by evening.








(2) Pneumonia due to COVID-19 virus


Conclusion/Plan: 


We will start remdesivir.  Today is day 5/5. Completes therapy. 


We will start Decadron.  Today is day 5/10.


DVT prophylaxis will be with Eliquis 2.5 mg p.o. twice daily.


Watching  for symptoms of secondary pneumonia and Cefitriaxone and azithromycin 

added 12/11. Today is Day 2.





Check sputum culture and chest xray.

## 2021-12-12 NOTE — XRAY REPORT
PROCEDURE:  Chest 1 View X-Ray

 

INDICATIONS:  Worsening hypoxia, wbc , covid

 

TECHNIQUE:  One view of the chest was acquired.  

 

COMPARISON:  12/8/2021

 

FINDINGS:  

 

Surgical changes and devices:  None.  

 

Lungs and pleura:  No pleural effusions or pneumothorax. Airspace opacities in both lungs have worsen
ed when compared with the prior study.

 

Mediastinum:  Mediastinal contours appear normal.  Heart size is normal.  

 

Bones and chest wall:  No suspicious bony lesions.  Overlying soft tissues appear unremarkable.  

 

IMPRESSION:  

Worsened airspace opacities in both lungs when compared with 12/8/2021 exam.

 

Reviewed by: Brigido Mauricio MD on 12/12/2021 10:23 AM PST

Approved by: Brigido Mauricio MD on 12/12/2021 10:23 AM PST

 

 

Station ID:  [id number]

## 2021-12-13 LAB
ANION GAP SERPL CALCULATED.4IONS-SCNC: 10 MMOL/L (ref 6–13)
BASOPHILS # BLD MANUAL: 0 10^3/UL (ref 0–0.1)
BASOPHILS NFR BLD AUTO: 0.2 %
BUN SERPL-MCNC: 11 MG/DL (ref 6–20)
CALCIUM UR-MCNC: 8.4 MG/DL (ref 8.5–10.3)
CHLORIDE SERPL-SCNC: 103 MMOL/L (ref 101–111)
CO2 SERPL-SCNC: 26 MMOL/L (ref 21–32)
CREAT SERPLBLD-SCNC: 0.4 MG/DL (ref 0.4–1)
EOSINOPHIL # BLD MANUAL: 0 10^3/UL (ref 0–0.7)
EOSINOPHIL NFR BLD AUTO: 0.3 %
ERYTHROCYTE [DISTWIDTH] IN BLOOD BY AUTOMATED COUNT: 12.3 % (ref 12–15)
GFRSERPLBLD MDRD-ARVRAT: 170 ML/MIN/{1.73_M2} (ref 89–?)
GLUCOSE SERPL-MCNC: 97 MG/DL (ref 70–100)
HCT VFR BLD AUTO: 37.6 % (ref 37–47)
HGB UR QL STRIP: 12.6 G/DL (ref 12–16)
LYMPH ABN NFR BLD MANUAL: 0 %
LYMPHOBLASTS # BLD: 7 %
LYMPHOCYTES # BLD MANUAL: 1.1 10^3/UL (ref 1.5–3.5)
LYMPHOCYTES NFR BLD AUTO: 11.7 %
MANUAL DIF COMMENT BLD-IMP: (no result)
MCH RBC QN AUTO: 27.3 PG (ref 27–31)
MCHC RBC AUTO-ENTMCNC: 33.5 G/DL (ref 32–36)
MCV RBC AUTO: 81.4 FL (ref 81–99)
MONOCYTES # BLD MANUAL: 0.8 10^3/UL (ref 0–1)
MONOCYTES NFR BLD AUTO: 9.5 %
MYELOCYTES NFR BLD: 1 %
NEUTROPHILS # SNV AUTO: 16.1 X10^3/UL (ref 4.8–10.8)
NEUTROPHILS NFR BLD AUTO: 73.8 %
NEUTROPHILS NFR BLD MANUAL: 14 10^3/UL (ref 1.5–6.6)
NEUTS BAND NFR BLD: 0 %
PDW BLD AUTO: 8.9 FL (ref 7.9–10.8)
PLAT MORPH BLD: (no result)
PLATELET # BLD: 343 10^3/UL (ref 130–450)
PLATELET BLD QL SMEAR: (no result)
POTASSIUM SERPL-SCNC: 3.4 MMOL/L (ref 3.5–5)
RBC MAR: 4.62 10^6/UL (ref 4.2–5.4)
RBC MORPH BLD: (no result)
SODIUM SERPLBLD-SCNC: 139 MMOL/L (ref 135–145)
WBC MORPH BLD: (no result)

## 2021-12-13 RX ADMIN — GUAIFENESIN PRN MG: 100 SOLUTION ORAL at 21:53

## 2021-12-13 RX ADMIN — ACETAMINOPHEN PRN MG: 325 TABLET ORAL at 17:14

## 2021-12-13 RX ADMIN — OXYCODONE PRN MG: 5 TABLET ORAL at 02:30

## 2021-12-13 RX ADMIN — Medication SCH MCG: at 09:25

## 2021-12-13 RX ADMIN — BENZONATATE PRN MG: 100 CAPSULE ORAL at 02:30

## 2021-12-13 RX ADMIN — SODIUM CHLORIDE SCH MLS/HR: 9 INJECTION, SOLUTION INTRAVENOUS at 11:10

## 2021-12-13 RX ADMIN — SODIUM CHLORIDE, PRESERVATIVE FREE SCH ML: 5 INJECTION INTRAVENOUS at 17:17

## 2021-12-13 RX ADMIN — Medication SCH MG: at 09:26

## 2021-12-13 RX ADMIN — DEXTROSE MONOHYDRATE SCH MLS/HR: 50 INJECTION, SOLUTION INTRAVENOUS at 09:24

## 2021-12-13 RX ADMIN — APIXABAN SCH MG: 2.5 TABLET, FILM COATED ORAL at 09:24

## 2021-12-13 RX ADMIN — BENZONATATE PRN MG: 100 CAPSULE ORAL at 21:52

## 2021-12-13 RX ADMIN — ACETAMINOPHEN PRN MG: 325 TABLET ORAL at 11:10

## 2021-12-13 RX ADMIN — OXYCODONE HYDROCHLORIDE AND ACETAMINOPHEN SCH MG: 500 TABLET ORAL at 09:24

## 2021-12-13 RX ADMIN — BENZONATATE PRN MG: 100 CAPSULE ORAL at 12:19

## 2021-12-13 RX ADMIN — APIXABAN SCH MG: 2.5 TABLET, FILM COATED ORAL at 21:53

## 2021-12-13 RX ADMIN — SODIUM CHLORIDE, PRESERVATIVE FREE SCH ML: 5 INJECTION INTRAVENOUS at 09:26

## 2021-12-13 RX ADMIN — SODIUM CHLORIDE, PRESERVATIVE FREE SCH ML: 5 INJECTION INTRAVENOUS at 02:30

## 2021-12-13 RX ADMIN — GUAIFENESIN PRN MG: 100 SOLUTION ORAL at 02:30

## 2021-12-13 RX ADMIN — DEXAMETHASONE SODIUM PHOSPHATE SCH MG: 10 INJECTION, SOLUTION INTRAMUSCULAR; INTRAVENOUS at 09:26

## 2021-12-13 RX ADMIN — ACETAMINOPHEN PRN MG: 325 TABLET ORAL at 02:30

## 2021-12-13 RX ADMIN — GUAIFENESIN PRN MG: 100 SOLUTION ORAL at 12:19

## 2021-12-13 RX ADMIN — OXYCODONE PRN MG: 5 TABLET ORAL at 21:52

## 2021-12-13 NOTE — PROVIDER PROGRESS NOTE
Assessment/Plan





- Problem List


(1) Acute respiratory failure with hypoxia


Assessment/Plan: 


Secondary to COVID-19 pneumonia.


Patient is currently on an FiO2 of 40% at 35 L/min on high flow nasal cannula.


Oxygen saturation is 96 to 99%.


On Decadron 5/9, remdesivir 5/5, azithromycin 3/3, Rocephin 3/5.


WBCs increased from 14.7 to 17.3








(2) Pneumonia due to COVID-19 virus


Assessment/Plan: 


Patient is currently on an FiO2 of 40% at 35 L/min on high flow nasal cannula.


Oxygen saturation is 96 to 99%.


On Decadron 5/9, remdesivir 5/5, azithromycin 3/3, Rocephin 3/5.


WBCs increased from 14.7 to 17.3








- Current Meds


Current Meds: 





                               Current Medications











Generic Name Dose Route Start Last Admin





  Trade Name Freq  PRN Reason Stop Dose Admin


 


Acetaminophen  650 mg  12/08/21 12:04  12/13/21 02:30





  Acetaminophen 325 Mg Tablet  PO   650 mg





  Q4HR PRN   Administration





  Pain 1 to 4  


 


Apixaban  2.5 mg  12/08/21 17:00  12/12/21 20:37





  Apixaban 2.5 Mg Tablet  PO   2.5 mg





  BID GEORGE   Administration


 


Ascorbic Acid  500 mg  12/09/21 13:00  12/12/21 08:31





  Ascorbic Acid 500 Mg Tablet  PO   500 mg





  DAILY GEORGE   Administration


 


Benzonatate  100 mg  12/10/21 00:11  12/13/21 02:30





  Benzonatate 100 Mg Capsule  PO   100 mg





  TID PRN   Administration





  Cough  


 


Cholecalciferol  50 mcg  12/09/21 13:00  12/12/21 08:31





  Cholecalciferol 25 Mcg Tablet  PO   50 mcg





  DAILY GEORGE   Administration


 


Dexamethasone  10 mg  12/09/21 09:00  12/12/21 08:31





  Dexamethasone 10 Mg/Ml Vial  IVP   10 mg





  DAILY GEORGE   Administration


 


Guaifenesin  100 mg  12/10/21 00:11  12/13/21 02:30





  Guaifenesin 100 Mg/5 Ml Udc  PO   100 mg





  Q6HR PRN   Administration





  Cough  


 


Ceftriaxone Sodium 1 gm/  100 mls @ 200 mls/hr  12/11/21 14:00  12/12/21 11:50





  Sodium Chloride  IV   Infused





  DAILY GEORGE   Infusion


 


Azithromycin 500 mg/ Sodium  250 mls @ 250 mls/hr  12/11/21 16:00  12/12/21 

13:53





  Chloride  IV  12/13/21 10:59  Infused





  1000 GEORGE   Infusion


 


Ondansetron HCl  4 mg  12/08/21 12:04  12/08/21 17:21





  Ondansetron Odt 4 Mg Tablet  TL   4 mg





  Q6HR PRN   Administration





  Nausea / Vomiting  


 


Oxycodone HCl  5 mg  12/08/21 12:04  12/13/21 02:30





  Oxycodone 5 Mg Tablet  PO   5 mg





  Q4HR PRN   Administration





  Pain 5 to 7  


 


Sodium Chloride  10 ml  12/08/21 17:00  12/13/21 02:30





  Sodium Chloride Flush 0.9% 10 Ml Syringe  IVP   10 ml





  0100,0900,1700 GEORGE   Administration


 


Zinc Sulfate  220 mg  12/09/21 13:00  12/12/21 08:31





  Zinc Sulfate 220 Mg Capsule  PO  12/17/21 09:00  220 mg





  DAILY GEORGE   Administration














- Lab Result


Fish Bone Diagrams: 


                                 12/13/21 05:46





                                 12/13/21 05:46





Subjective





- Subjective


Patient Reports: Other (Resting comfortably in bed.  She was significantly 

exhausted from transferring to the bedside commode and back.  Cough persist.  

Reports slight improvement in breathing.  Denied any other complaints.)





Objective


Vital Signs: 





                               Vital Signs - 24 hr











  12/12/21 12/12/21 12/12/21





  09:20 12:27 16:50


 


Temperature 36.5 C 36.5 C 36.8 C


 


Heart Rate [ 61 55 L 59 L





Brachial]   


 


Respiratory 18 18 18





Rate   


 


Blood Pressure 113/75 141/87 H 129/82 H





[Right Brachial   





artery]   


 


O2 Saturation 95 95 95














  12/12/21 12/13/21 12/13/21





  20:36 01:00 05:00


 


Temperature 36.8 C 36.5 C 36.6 C


 


Heart Rate [ 60 51 L 51 L





Brachial]   


 


Respiratory 20 18 18





Rate   


 


Blood Pressure 158/92 H 140/92 H 127/86 H





[Right Brachial   





artery]   


 


O2 Saturation 95 98 96








                                     Oxygen











O2 Source                      [facility]


 


Oxygen Flow Rate               2














I&O (Last 24 Hrs): 





                          Intake and Output Totals x24h











 12/11/21 12/12/21 12/13/21





 23:59 23:59 23:59


 


Intake Total 1750 2090 


 


Balance 1750 2090 











General: Alert, Oriented x3, Mild distress


HEENT: PERRLA, EOMI


Neck: Supple, No JVD


Neuro: Alert, Non Focal, Oriented Times 3


Cardiovascular: Regular rate, Normal S1, Normal S2, No murmurs


Respiratory: Chest non-tender, Other (Mild respiratory distress)


Abdomen: Normal bowel sounds, Soft, No tenderness, No masses


Extremities: No clubbing, No cyanosis, No edema


Skin: No rashes, No breakdown, No significant lesion





- Results


Results: 





                               Laboratory Results











WBC  16.1 x10^3/uL (4.8-10.8)  H  12/13/21  05:46    


 


RBC  4.62 10^6/uL (4.20-5.40)   12/13/21  05:46    


 


Hgb  12.6 g/dL (12.0-16.0)   12/13/21  05:46    


 


Hct  37.6 % (37.0-47.0)   12/13/21  05:46    


 


MCV  81.4 fL (81.0-99.0)   12/13/21  05:46    


 


MCH  27.3 pg (27.0-31.0)   12/13/21  05:46    


 


MCHC  33.5 g/dL (32.0-36.0)   12/13/21  05:46    


 


RDW  12.3 % (12.0-15.0)   12/13/21  05:46    


 


Plt Count  343 10^3/uL (130-450)   12/13/21  05:46    


 


MPV  8.9 fL (7.9-10.8)   12/13/21  05:46    


 


Neut # (Auto)  Not Reportable   12/13/21  05:46    


 


Lymph # (Auto)  Not Reportable   12/13/21  05:46    


 


Mono # (Auto)  Not Reportable   12/13/21  05:46    


 


Eos # (Auto)  Not Reportable   12/13/21  05:46    


 


Baso # (Auto)  Not Reportable   12/13/21  05:46    


 


Absolute Nucleated RBC  Not Reportable   12/13/21  05:46    


 


Total Counted  100   12/13/21  05:46    


 


Band Neuts % (Manual)  0 % (0-10)  12/13/21  05:46    


 


Abnorm Lymph % (Manual)  0 %  12/13/21  05:46    


 


Myelocytes %  1 % (-0) H  12/13/21  05:46    


 


Nucleated RBC %  Not Reportable   12/13/21  05:46    


 


Neutrophils # (Manual)  14.0 10^3/uL (1.5-6.6)  H  12/13/21  05:46    


 


Lymphocytes # (Manual)  1.1 10^3/uL (1.5-3.5)  L  12/13/21  05:46    


 


Monocytes # (Manual)  0.8 10^3/uL (0.0-1.0)   12/13/21  05:46    


 


Eosinophils # (Manual)  0.0 10^3/uL (0-0.7)   12/13/21  05:46    


 


Basophils # (Manual)  0.0 10^3/uL (0-0.1)   12/13/21  05:46    


 


Differential Comment  MANUAL DIFFERENTIAL   12/13/21  05:46    


 


Manual Slide Review  Indicated   12/08/21  11:31    


 


WBC Morphology  NORMAL APPEARANCE  (NORMAL)   12/13/21  05:46    


 


Platelet Estimate  NORMAL (130-450,000)  (NORMAL)   12/13/21  05:46    


 


Platelet Morphology  NORMAL APPEARANCE  (NORMAL)   12/13/21  05:46    


 


RBC Morph Micro Appear  NORMAL APPEARANCE  (NORMAL)   12/13/21  05:46    


 


Sodium  139 mmol/L (135-145)   12/13/21  05:46    


 


Potassium  3.4 mmol/L (3.5-5.0)  L  12/13/21  05:46    


 


Chloride  103 mmol/L (101-111)   12/13/21  05:46    


 


Carbon Dioxide  26 mmol/L (21-32)   12/13/21  05:46    


 


Anion Gap  10.0  (6-13)   12/13/21  05:46    


 


BUN  11 mg/dL (6-20)   12/13/21  05:46    


 


Creatinine  0.4 mg/dL (0.4-1.0)   12/13/21  05:46    


 


Estimated GFR (MDRD)  170  (>89)   12/13/21  05:46    


 


Glucose  97 mg/dL ()   12/13/21  05:46    


 


Calcium  8.4 mg/dL (8.5-10.3)  L  12/13/21  05:46    


 


Total Bilirubin  0.7 mg/dL (0.2-1.0)   12/12/21  05:35    


 


Direct Bilirubin  0.2 mg/dL (0.1-0.5)   12/12/21  05:35    


 


AST  32 IU/L (10-42)   12/12/21  05:35    


 


ALT  40 IU/L (10-60)   12/12/21  05:35    


 


Alkaline Phosphatase  50 IU/L ()   12/12/21  05:35    


 


Total Protein  6.2 g/dL (6.7-8.2)  L  12/12/21  05:35    


 


Albumin  2.9 g/dL (3.2-5.5)  L  12/12/21  05:35    


 


Globulin  3.3 g/dL (2.1-4.2)   12/12/21  05:35    


 


Albumin/Globulin Ratio  0.8  (1.0-2.2)  L  12/08/21  11:31    


 


Lipase  26 U/L (22-51)   12/08/21  11:31    


 


Nasal Adenovirus (PCR)  NOT DETECTED   12/08/21  10:50    


 


Nasal B. parapertussis DNA (PCR)  NOT DETECTED   12/08/21  10:50    


 


Nasal Coronavir 229E PCR  NOT DETECTED   12/08/21  10:50    


 


Nasal Coronavir HKU1 PCR  NOT DETECTED   12/08/21  10:50    


 


Nasal Coronavir NL63 PCR  NOT DETECTED   12/08/21  10:50    


 


Nasal Coronavir OC43 PCR  NOT DETECTED   12/08/21  10:50    


 


Nasal Enterovir/Rhinovir PCR  NOT DETECTED   12/08/21  10:50    


 


Nasal Influenza B PCR  NOT DETECTED   12/08/21  10:50    


 


Nasal Influenza A PCR  NOT DETECTED   12/08/21  10:50    


 


Nasal Parainfluen 1 PCR  NOT DETECTED   12/08/21  10:50    


 


Nasal Parainfluen 2 PCR  NOT DETECTED   12/08/21  10:50    


 


Nasal Parainfluen 3 PCR  NOT DETECTED   12/08/21  10:50    


 


Nasal Parainfluen 4 PCR  NOT DETECTED   12/08/21  10:50    


 


Nasal RSV (PCR)  NOT DETECTED   12/08/21  10:50    


 


Nasal B.pertussis DNA PCR  NOT DETECTED   12/08/21  10:50    


 


Nasal C.pneumoniae (PCR)  NOT DETECTED   12/08/21  10:50    


 


Jose Human Metapneumo PCR  NOT DETECTED   12/08/21  10:50    


 


Nasal M.pneumoniae (PCR)  NOT DETECTED   12/08/21  10:50    


 


Nasal SARS-CoV-2 (PCR)  DETECTED  A  12/08/21  10:50    














ABX Reporting


Has patient been on IV antibiotics over the past 48 hours?: Yes

## 2021-12-14 LAB
ANION GAP SERPL CALCULATED.4IONS-SCNC: 11 MMOL/L (ref 6–13)
BASOPHILS NFR BLD AUTO: 0 10^3/UL (ref 0–0.1)
BASOPHILS NFR BLD AUTO: 0.2 %
BUN SERPL-MCNC: 12 MG/DL (ref 6–20)
CALCIUM UR-MCNC: 8.8 MG/DL (ref 8.5–10.3)
CHLORIDE SERPL-SCNC: 101 MMOL/L (ref 101–111)
CO2 SERPL-SCNC: 26 MMOL/L (ref 21–32)
CREAT SERPLBLD-SCNC: 0.5 MG/DL (ref 0.4–1)
EOSINOPHIL # BLD AUTO: 0.1 10^3/UL (ref 0–0.7)
EOSINOPHIL NFR BLD AUTO: 0.3 %
ERYTHROCYTE [DISTWIDTH] IN BLOOD BY AUTOMATED COUNT: 12.4 % (ref 12–15)
GFRSERPLBLD MDRD-ARVRAT: 131 ML/MIN/{1.73_M2} (ref 89–?)
GLUCOSE SERPL-MCNC: 90 MG/DL (ref 70–100)
HCT VFR BLD AUTO: 41.9 % (ref 37–47)
HGB UR QL STRIP: 14 G/DL (ref 12–16)
LYMPHOCYTES # SPEC AUTO: 2.2 10^3/UL (ref 1.5–3.5)
LYMPHOCYTES NFR BLD AUTO: 13 %
MCH RBC QN AUTO: 27.8 PG (ref 27–31)
MCHC RBC AUTO-ENTMCNC: 33.4 G/DL (ref 32–36)
MCV RBC AUTO: 83.3 FL (ref 81–99)
MONOCYTES # BLD AUTO: 1.5 10^3/UL (ref 0–1)
MONOCYTES NFR BLD AUTO: 8.9 %
NEUTROPHILS # BLD AUTO: 12.3 10^3/UL (ref 1.5–6.6)
NEUTROPHILS # SNV AUTO: 16.8 X10^3/UL (ref 4.8–10.8)
NEUTROPHILS NFR BLD AUTO: 73.4 %
NRBC # BLD AUTO: 0 /100WBC
NRBC # BLD AUTO: 0 X10^3/UL
PDW BLD AUTO: 9.1 FL (ref 7.9–10.8)
PLAT MORPH BLD: (no result)
PLATELET # BLD: 416 10^3/UL (ref 130–450)
PLATELET BLD QL SMEAR: (no result)
POTASSIUM SERPL-SCNC: 3.8 MMOL/L (ref 3.5–5)
RBC MAR: 5.03 10^6/UL (ref 4.2–5.4)
RBC MORPH BLD: (no result)
SODIUM SERPLBLD-SCNC: 138 MMOL/L (ref 135–145)
WBC MORPH BLD: (no result)

## 2021-12-14 RX ADMIN — APIXABAN SCH MG: 2.5 TABLET, FILM COATED ORAL at 20:29

## 2021-12-14 RX ADMIN — GUAIFENESIN PRN MG: 100 SOLUTION ORAL at 10:30

## 2021-12-14 RX ADMIN — Medication SCH MCG: at 10:28

## 2021-12-14 RX ADMIN — SODIUM CHLORIDE, PRESERVATIVE FREE SCH ML: 5 INJECTION INTRAVENOUS at 10:29

## 2021-12-14 RX ADMIN — OXYCODONE HYDROCHLORIDE AND ACETAMINOPHEN SCH MG: 500 TABLET ORAL at 10:28

## 2021-12-14 RX ADMIN — BENZONATATE PRN MG: 100 CAPSULE ORAL at 20:29

## 2021-12-14 RX ADMIN — BENZONATATE PRN MG: 100 CAPSULE ORAL at 10:30

## 2021-12-14 RX ADMIN — DEXAMETHASONE SODIUM PHOSPHATE SCH MG: 10 INJECTION, SOLUTION INTRAMUSCULAR; INTRAVENOUS at 10:29

## 2021-12-14 RX ADMIN — ACETAMINOPHEN PRN MG: 325 TABLET ORAL at 14:19

## 2021-12-14 RX ADMIN — Medication SCH MG: at 10:29

## 2021-12-14 RX ADMIN — SODIUM CHLORIDE, PRESERVATIVE FREE SCH ML: 5 INJECTION INTRAVENOUS at 16:25

## 2021-12-14 RX ADMIN — ACETAMINOPHEN PRN MG: 325 TABLET ORAL at 10:30

## 2021-12-14 RX ADMIN — APIXABAN SCH MG: 2.5 TABLET, FILM COATED ORAL at 10:28

## 2021-12-14 RX ADMIN — SODIUM CHLORIDE, PRESERVATIVE FREE SCH ML: 5 INJECTION INTRAVENOUS at 02:43

## 2021-12-14 RX ADMIN — DEXTROSE MONOHYDRATE SCH MLS/HR: 50 INJECTION, SOLUTION INTRAVENOUS at 10:29

## 2021-12-14 RX ADMIN — ACETAMINOPHEN PRN MG: 325 TABLET ORAL at 20:29

## 2021-12-14 RX ADMIN — BENZONATATE PRN MG: 100 CAPSULE ORAL at 14:08

## 2021-12-14 RX ADMIN — Medication SCH MG: at 16:24

## 2021-12-14 RX ADMIN — GUAIFENESIN PRN MG: 100 SOLUTION ORAL at 16:24

## 2021-12-14 RX ADMIN — OXYCODONE PRN MG: 5 TABLET ORAL at 20:29

## 2021-12-14 NOTE — PROVIDER PROGRESS NOTE
Assessment/Plan





- Problem List


(1) Acute respiratory failure with hypoxia


Assessment/Plan: 





12/14 Patient reported she do feel significantly better For her breathing, she 

had 98% O2 sat on 30 HHFNC with 33% Fio2. discussed with RT, will try gradually 

to reduce Her oxygen supplement as her tolerance.





(2) Pneumonia due to COVID-19 virus


improved. lung sound is more clear. pt finished Remdesivir, continue Dcatron  

and blood thinner now. will reduce O2 Supplements as patient tolerated. 








- Current Meds


Current Meds: 





                               Current Medications











Generic Name Dose Route Start Last Admin





  Trade Name Freq  PRN Reason Stop Dose Admin


 


Acetaminophen  650 mg  12/08/21 12:04  12/14/21 10:30





  Acetaminophen 325 Mg Tablet  PO   650 mg





  Q4HR PRN   Administration





  Pain 1 to 4  


 


Apixaban  2.5 mg  12/08/21 17:00  12/14/21 10:28





  Apixaban 2.5 Mg Tablet  PO   2.5 mg





  BID GEORGE   Administration


 


Ascorbic Acid  500 mg  12/09/21 13:00  12/14/21 10:28





  Ascorbic Acid 500 Mg Tablet  PO   500 mg





  DAILY GEORGE   Administration


 


Benzonatate  100 mg  12/10/21 00:11  12/14/21 10:30





  Benzonatate 100 Mg Capsule  PO   100 mg





  TID PRN   Administration





  Cough  


 


Cholecalciferol  50 mcg  12/09/21 13:00  12/14/21 10:28





  Cholecalciferol 25 Mcg Tablet  PO   50 mcg





  DAILY GEORGE   Administration


 


Dexamethasone  10 mg  12/09/21 09:00  12/14/21 10:29





  Dexamethasone 10 Mg/Ml Vial  IVP   10 mg





  DAILY GEORGE   Administration


 


Guaifenesin  100 mg  12/10/21 00:11  12/14/21 10:30





  Guaifenesin 100 Mg/5 Ml Udc  PO   100 mg





  Q6HR PRN   Administration





  Cough  


 


Ceftriaxone Sodium 1 gm/  100 mls @ 200 mls/hr  12/11/21 14:00  12/14/21 10:29





  Sodium Chloride  IV   200 mls/hr





  DAILY GEORGE   Administration


 


Ondansetron HCl  4 mg  12/08/21 12:04  12/08/21 17:21





  Ondansetron Odt 4 Mg Tablet  TL   4 mg





  Q6HR PRN   Administration





  Nausea / Vomiting  


 


Oxycodone HCl  5 mg  12/08/21 12:04  12/13/21 21:52





  Oxycodone 5 Mg Tablet  PO   5 mg





  Q4HR PRN   Administration





  Pain 5 to 7  


 


Saccharomyces Boulardii  250 mg  12/14/21 08:00  12/14/21 10:29





  Saccharomyces Boulardii 250 Mg Capsule  PO   250 mg





  BIDWM GEORGE   Administration


 


Sodium Chloride  10 ml  12/08/21 17:00  12/14/21 10:29





  Sodium Chloride Flush 0.9% 10 Ml Syringe  IVP   10 ml





  0100,0900,1700 GEORGE   Administration


 


Zinc Sulfate  220 mg  12/09/21 13:00  12/14/21 10:29





  Zinc Sulfate 220 Mg Capsule  PO  12/17/21 09:00  220 mg





  DAILY GEORGE   Administration














- Lab Result


Fish Bone Diagrams: 


                                 12/14/21 07:37





                                 12/14/21 07:37





- Additional Planning


My Orders: 





My Active Orders





12/14/21 08:00


Saccharomyces Boulardii [Florastor]   250 mg PO BIDWM 





12/15/21 05:00


BMP - BASIC METABOLIC PANEL [CHEM] [facility]ILYLAB 


CBC - COMP BLD CT W/AUTO DIFF [HEME] [facility]ILY[facility] 





12/16/21 05:00


BMP - BASIC METABOLIC PANEL [CHEM] [facility]ILY[facility] 


CBC - COMP BLD CT W/AUTO DIFF [HEME] [facility] 





12/17/21 05:00


BMP - BASIC METABOLIC PANEL [CHEM] [facility] 


CBC - COMP BLD CT W/AUTO DIFF [HEME] [facility] 





12/18/21 05:00


BMP - BASIC METABOLIC PANEL [CHEM] [facility]ILY[facility] 


CBC - COMP BLD CT W/AUTO DIFF [HEME] [facility]ILY[facility] 





12/19/21 05:00


BMP - BASIC METABOLIC PANEL [CHEM] [facility]ILY[facility] 


CBC - COMP BLD CT W/AUTO DIFF [HEME] [facility]ILY[facility] 














Subjective





- Subjective


Patient Reports: Feeling Better, Resting Comfortably





Objective


Vital Signs: 





                               Vital Signs - 24 hr











  12/13/21 12/13/21 12/13/21





  12:11 17:15 22:00


 


Temperature 36.6 C 36.5 C 36.5 C


 


Heart Rate [ 60 62 69





Brachial]   


 


Respiratory 22 18 20





Rate   


 


Blood Pressure 120/71 139/93 H 127/87 H





[Right Brachial   





artery]   


 


O2 Saturation 99 99 98














  12/14/21 12/14/21 12/14/21





  02:44 05:27 08:12


 


Temperature 36.8 C 36.4 C L 36.4 C L


 


Heart Rate [ 61 61 58 L





Brachial]   


 


Respiratory 19 16 20





Rate   


 


Blood Pressure 139/96 H 115/78 143/96 H





[Right Brachial   





artery]   


 


O2 Saturation  94 95








                                     Oxygen











O2 Source                      [facility]


 


Oxygen Flow Rate               2














I&O (Last 24 Hrs): 





                          Intake and Output Totals x24h











 12/12/21 12/13/21 12/14/21





 23:59 23:59 23:59


 


Intake Total 2090 2230 980


 


Output Total   550


 


Balance 2090 2230 430











General: Alert, Oriented x3, Cooperative, No acute distress


HEENT: Atraumatic


Neck: Supple


Lymphatic: no adenopathy


Neuro: Alert, Non Focal, Oriented Times 3


Cardiovascular: Regular rate, Normal S1


Respiratory: Chest non-tender, No respiratory distress


Extremities: Normal pulses





- Results


Results: 





                               Laboratory Results











WBC  16.8 x10^3/uL (4.8-10.8)  H  12/14/21  07:37    


 


RBC  5.03 10^6/uL (4.20-5.40)   12/14/21  07:37    


 


Hgb  14.0 g/dL (12.0-16.0)   12/14/21  07:37    


 


Hct  41.9 % (37.0-47.0)   12/14/21  07:37    


 


MCV  83.3 fL (81.0-99.0)   12/14/21  07:37    


 


MCH  27.8 pg (27.0-31.0)   12/14/21  07:37    


 


MCHC  33.4 g/dL (32.0-36.0)   12/14/21  07:37    


 


RDW  12.4 % (12.0-15.0)   12/14/21  07:37    


 


Plt Count  416 10^3/uL (130-450)   12/14/21  07:37    


 


MPV  9.1 fL (7.9-10.8)   12/14/21  07:37    


 


Neut # (Auto)  12.3 10^3/uL (1.5-6.6)  H  12/14/21  07:37    


 


Lymph # (Auto)  2.2 10^3/uL (1.5-3.5)   12/14/21  07:37    


 


Mono # (Auto)  1.5 10^3/uL (0.0-1.0)  H  12/14/21  07:37    


 


Eos # (Auto)  0.1 10^3/uL (0.0-0.7)   12/14/21  07:37    


 


Baso # (Auto)  0.0 10^3/uL (0.0-0.1)   12/14/21  07:37    


 


Absolute Nucleated RBC  0.00 x10^3/uL  12/14/21  07:37    


 


Total Counted  100   12/13/21  05:46    


 


Band Neuts % (Manual)  0 % (0-10)  12/13/21  05:46    


 


Abnorm Lymph % (Manual)  0 %  12/13/21  05:46    


 


Myelocytes %  1 % (-0) H  12/13/21  05:46    


 


Nucleated RBC %  0.0 /100WBC  12/14/21  07:37    


 


Neutrophils # (Manual)  14.0 10^3/uL (1.5-6.6)  H  12/13/21  05:46    


 


Lymphocytes # (Manual)  1.1 10^3/uL (1.5-3.5)  L  12/13/21  05:46    


 


Monocytes # (Manual)  0.8 10^3/uL (0.0-1.0)   12/13/21  05:46    


 


Eosinophils # (Manual)  0.0 10^3/uL (0-0.7)   12/13/21  05:46    


 


Basophils # (Manual)  0.0 10^3/uL (0-0.1)   12/13/21  05:46    


 


Differential Comment  MANUAL DIFFERENTIAL   12/13/21  05:46    


 


Manual Slide Review  Indicated   12/14/21  07:37    


 


WBC Morphology  NORMAL APPEARANCE  (NORMAL)   12/14/21  07:37    


 


Platelet Estimate  NORMAL (130-450,000)  (NORMAL)   12/14/21  07:37    


 


Platelet Morphology  NORMAL APPEARANCE  (NORMAL)   12/14/21  07:37    


 


RBC Morph Micro Appear  NORMAL APPEARANCE  (NORMAL)   12/14/21  07:37    


 


Sodium  138 mmol/L (135-145)   12/14/21  07:37    


 


Potassium  3.8 mmol/L (3.5-5.0)   12/14/21  07:37    


 


Chloride  101 mmol/L (101-111)   12/14/21  07:37    


 


Carbon Dioxide  26 mmol/L (21-32)   12/14/21  07:37    


 


Anion Gap  11.0  (6-13)   12/14/21  07:37    


 


BUN  12 mg/dL (6-20)   12/14/21  07:37    


 


Creatinine  0.5 mg/dL (0.4-1.0)   12/14/21  07:37    


 


Estimated GFR (MDRD)  131  (>89)   12/14/21  07:37    


 


Glucose  90 mg/dL ()   12/14/21  07:37    


 


Calcium  8.8 mg/dL (8.5-10.3)   12/14/21  07:37    


 


Total Bilirubin  0.7 mg/dL (0.2-1.0)   12/12/21  05:35    


 


Direct Bilirubin  0.2 mg/dL (0.1-0.5)   12/12/21  05:35    


 


AST  32 IU/L (10-42)   12/12/21  05:35    


 


ALT  40 IU/L (10-60)   12/12/21  05:35    


 


Alkaline Phosphatase  50 IU/L ()   12/12/21  05:35    


 


Total Protein  6.2 g/dL (6.7-8.2)  L  12/12/21  05:35    


 


Albumin  2.9 g/dL (3.2-5.5)  L  12/12/21  05:35    


 


Globulin  3.3 g/dL (2.1-4.2)   12/12/21  05:35    


 


Albumin/Globulin Ratio  0.8  (1.0-2.2)  L  12/08/21  11:31    


 


Lipase  26 U/L (22-51)   12/08/21  11:31    


 


Nasal Adenovirus (PCR)  NOT DETECTED   12/08/21  10:50    


 


Nasal B. parapertussis DNA (PCR)  NOT DETECTED   12/08/21  10:50    


 


Nasal Coronavir 229E PCR  NOT DETECTED   12/08/21  10:50    


 


Nasal Coronavir HKU1 PCR  NOT DETECTED   12/08/21  10:50    


 


Nasal Coronavir NL63 PCR  NOT DETECTED   12/08/21  10:50    


 


Nasal Coronavir OC43 PCR  NOT DETECTED   12/08/21  10:50    


 


Nasal Enterovir/Rhinovir PCR  NOT DETECTED   12/08/21  10:50    


 


Nasal Influenza B PCR  NOT DETECTED   12/08/21  10:50    


 


Nasal Influenza A PCR  NOT DETECTED   12/08/21  10:50    


 


Nasal Parainfluen 1 PCR  NOT DETECTED   12/08/21  10:50    


 


Nasal Parainfluen 2 PCR  NOT DETECTED   12/08/21  10:50    


 


Nasal Parainfluen 3 PCR  NOT DETECTED   12/08/21  10:50    


 


Nasal Parainfluen 4 PCR  NOT DETECTED   12/08/21  10:50    


 


Nasal RSV (PCR)  NOT DETECTED   12/08/21  10:50    


 


Nasal B.pertussis DNA PCR  NOT DETECTED   12/08/21  10:50    


 


Nasal C.pneumoniae (PCR)  NOT DETECTED   12/08/21  10:50    


 


Jose Human Metapneumo PCR  NOT DETECTED   12/08/21  10:50    


 


Nasal M.pneumoniae (PCR)  NOT DETECTED   12/08/21  10:50    


 


Nasal SARS-CoV-2 (PCR)  DETECTED  A  12/08/21  10:50    














ABX Reporting


Has patient been on IV antibiotics over the past 48 hours?: Yes





Current Medications





- Current Medications


Current Medications: 








Active Medications





Acetaminophen (Acetaminophen 325 Mg Tablet)  650 mg PO Q4HR PRN


   PRN Reason: Pain 1 to 4


   Last Admin: 12/14/21 10:30 Dose:  650 mg


   Documented by: 


Apixaban (Apixaban 2.5 Mg Tablet)  2.5 mg PO BID [facility]


   Last Admin: 12/14/21 10:28 Dose:  2.5 mg


   Documented by: 


Ascorbic Acid (Ascorbic Acid 500 Mg Tablet)  500 mg PO DAILY [facility]


   Last Admin: 12/14/21 10:28 Dose:  500 mg


   Documented by: 


Benzonatate (Benzonatate 100 Mg Capsule)  100 mg PO TID PRN


   PRN Reason: Cough


   Last Admin: 12/14/21 10:30 Dose:  100 mg


   Documented by: 


Cholecalciferol (Cholecalciferol 25 Mcg Tablet)  50 mcg PO DAILY [facility]


   Last Admin: 12/14/21 10:28 Dose:  50 mcg


   Documented by: 


Dexamethasone (Dexamethasone 4 Mg Tablet)  6 mg PO DAILY [facility]


Guaifenesin (Guaifenesin 100 Mg/5 Ml Udc)  100 mg PO Q6HR PRN


   PRN Reason: Cough


   Last Admin: 12/14/21 10:30 Dose:  100 mg


   Documented by: 


Ceftriaxone Sodium 1 gm/ (Sodium Chloride)  100 mls @ 200 mls/hr IV DAILY [facility]


   Last Admin: 12/14/21 10:29 Dose:  200 mls/hr


   Documented by: 


Ondansetron HCl (Ondansetron Odt 4 Mg Tablet)  4 mg TL Q6HR PRN


   PRN Reason: Nausea / Vomiting


   Last Admin: 12/08/21 17:21 Dose:  4 mg


   Documented by: 


Oxycodone HCl (Oxycodone 5 Mg Tablet)  5 mg PO Q4HR PRN


   PRN Reason: Pain 5 to 7


   Last Admin: 12/13/21 21:52 Dose:  5 mg


   Documented by: 


Prochlorperazine Edisylate (Prochlorperazine 10 Mg/2 Ml Vial)  10 mg IVP Q6HR 

PRN


   PRN Reason: Nausea / Vomiting


Promethazine HCl (Promethazine 25 Mg/1 Ml Vial)  25 mg IM Q6HR PRN


   PRN Reason: Nausea / Vomiting


Saccharomyces Boulardii (Saccharomyces Boulardii 250 Mg Capsule)  250 mg PO 

BIDWM [facility]


   Last Admin: 12/14/21 10:29 Dose:  250 mg


   Documented by: 


Sodium Chloride (Sodium Chloride Flush 0.9% 10 Ml Syringe)  10 ml IVP PRN PRN


   PRN Reason: AS NEEDED PER PROVIDER ORDERS


Sodium Chloride (Sodium Chloride Flush 0.9% 10 Ml Syringe)  10 ml IVP 

0100,[id number],1700 [facility]


   Last Admin: 12/14/21 10:29 Dose:  10 ml


   Documented by: 


Zinc Sulfate (Zinc Sulfate 220 Mg Capsule)  220 mg PO DAILY [facility]


   Stop: 12/17/21 09:00


   Last Admin: 12/14/21 10:29 Dose:  220 mg


   Documented by: 





                                        





No Known Home Medications  12/09/21

## 2021-12-15 VITALS — SYSTOLIC BLOOD PRESSURE: 119 MMHG | DIASTOLIC BLOOD PRESSURE: 75 MMHG

## 2021-12-15 LAB
ANION GAP SERPL CALCULATED.4IONS-SCNC: 11 MMOL/L (ref 6–13)
BASOPHILS NFR BLD AUTO: 0.1 10^3/UL (ref 0–0.1)
BASOPHILS NFR BLD AUTO: 0.3 %
BUN SERPL-MCNC: 12 MG/DL (ref 6–20)
CALCIUM UR-MCNC: 8.8 MG/DL (ref 8.5–10.3)
CHLORIDE SERPL-SCNC: 101 MMOL/L (ref 101–111)
CO2 SERPL-SCNC: 26 MMOL/L (ref 21–32)
CREAT SERPLBLD-SCNC: 0.5 MG/DL (ref 0.4–1)
EOSINOPHIL # BLD AUTO: 0.1 10^3/UL (ref 0–0.7)
EOSINOPHIL NFR BLD AUTO: 0.2 %
ERYTHROCYTE [DISTWIDTH] IN BLOOD BY AUTOMATED COUNT: 12.6 % (ref 12–15)
GFRSERPLBLD MDRD-ARVRAT: 131 ML/MIN/{1.73_M2} (ref 89–?)
GLUCOSE SERPL-MCNC: 86 MG/DL (ref 70–100)
HCT VFR BLD AUTO: 44.5 % (ref 37–47)
HGB UR QL STRIP: 14.6 G/DL (ref 12–16)
LYMPHOCYTES # SPEC AUTO: 2.5 10^3/UL (ref 1.5–3.5)
LYMPHOCYTES NFR BLD AUTO: 12 %
MCH RBC QN AUTO: 27.5 PG (ref 27–31)
MCHC RBC AUTO-ENTMCNC: 32.8 G/DL (ref 32–36)
MCV RBC AUTO: 83.8 FL (ref 81–99)
MONOCYTES # BLD AUTO: 1.9 10^3/UL (ref 0–1)
MONOCYTES NFR BLD AUTO: 9.1 %
NEUTROPHILS # BLD AUTO: 15.6 10^3/UL (ref 1.5–6.6)
NEUTROPHILS # SNV AUTO: 20.8 X10^3/UL (ref 4.8–10.8)
NEUTROPHILS NFR BLD AUTO: 75 %
NRBC # BLD AUTO: 0 /100WBC
NRBC # BLD AUTO: 0 X10^3/UL
PDW BLD AUTO: 10.2 FL (ref 7.9–10.8)
PLAT MORPH BLD: (no result)
PLATELET # BLD: 428 10^3/UL (ref 130–450)
PLATELET BLD QL SMEAR: (no result)
POTASSIUM SERPL-SCNC: 3.9 MMOL/L (ref 3.5–5)
RBC MAR: 5.31 10^6/UL (ref 4.2–5.4)
RBC MORPH BLD: (no result)
SODIUM SERPLBLD-SCNC: 138 MMOL/L (ref 135–145)
WBC MORPH BLD: (no result)

## 2021-12-15 RX ADMIN — APIXABAN SCH MG: 2.5 TABLET, FILM COATED ORAL at 09:12

## 2021-12-15 RX ADMIN — DEXTROSE MONOHYDRATE SCH MLS/HR: 50 INJECTION, SOLUTION INTRAVENOUS at 09:13

## 2021-12-15 RX ADMIN — OXYCODONE PRN MG: 5 TABLET ORAL at 10:22

## 2021-12-15 RX ADMIN — SODIUM CHLORIDE, PRESERVATIVE FREE SCH ML: 5 INJECTION INTRAVENOUS at 01:13

## 2021-12-15 RX ADMIN — OXYCODONE HYDROCHLORIDE AND ACETAMINOPHEN SCH MG: 500 TABLET ORAL at 09:12

## 2021-12-15 RX ADMIN — Medication SCH MG: at 09:12

## 2021-12-15 RX ADMIN — Medication SCH MG: at 10:22

## 2021-12-15 RX ADMIN — SODIUM CHLORIDE, PRESERVATIVE FREE SCH ML: 5 INJECTION INTRAVENOUS at 09:14

## 2021-12-15 RX ADMIN — Medication SCH MCG: at 09:12

## 2021-12-15 NOTE — DISCHARGE SUMMARY
Discharge Summary


Admit Date: 12/08/21


Discharge Date: 12/15/21


Discharging Provider: Dillon Hirsch


Primary Care Provider: Rad Vazquez


Condition at Discharge: Stable


Discharge Disposition: 01 Home, Self Care


Discharge Facility Name: home





- DIAGNOSES


Discharge Diagnoses with Status of Each Condition: 





(1) Acute respiratory failure with hypoxia


RT did O2 desat study for pt. pt has 96% O2 sat on room air at rest, pt feel 

very comfortable and has no acute respiratory distress. At ambulation on room 

air, patient's oxygen saturation fell to 85%. on 1 L of oxygen with ambulation, 

her oxygen sat is 87%. on 2 L of oxygen at ambulation, patient has 95% oxygen 

saturation. I am ordering home oxygen on 2 LPM by NC for patient at exertion to 

treat her COVID-19 pneumonia infection. pt really want to be discharged to her 

home on today. 





(2) Pneumonia due to COVID-19 virus


stable, pt has no respiratory distress at rest on room air, she had 96% O2 sat. 

But pt need 2 liter of O2 on exertion. pt was also prescribed Augmentin to 

finish her bacterial pneumonia infection treatment course. Decatron is prescr

ibed to finish the treatment course for Covid 19, and Tessalon PRN for cough. 





(3)Leukocytosis


pt had elevated WBC which is likely caused by her usage of Decatron in the hosp

ital. Pt may followup with her PCP to recheck CBC in 1-2 weeks, and followup 

with hematologist as needed. 





- HPI


History of Present Illness: 


refer from Dr. Núñez's HPI on 12/8/21


51-year-old white female who has no past medical history that presents for a sec

ond time with cough, fever, fatigue.  She was seen in the emergency room a few 

days ago and diagnosed with Covid pneumonia but did not meet criteria for 

monoclonal antibody. She became ill 11/28 with fever, myalgias and loss of taste

and self tested with a drug store test and was positive.  Her exposure was due 

to her  who is a [employer]  and he was ill for 6 days and she 

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 They are both unvaccinated.  She retested on 12/4 becasue the 

cough, and fever, with poor po intake was so bad.  She was again Covid (+) so 

went to the ER. She was interested in getting the mab but did not meet criteria.

She was discharged on azithromycin.  Over the last few days she is getting 

increasingly nauseated, fatigue, unable to take her medications.  She has been 

more short of breath, fatigued, and has been lightheaded trying to get up and do

things.  She almost passed out in the shower this morning.  She cannot smell 

anything, does not want to eat anything.  She called EMS where room her 

saturation was 79 to 87% on room air.  In the emergency room she is been given 4

L nasal cannula and her O2 sat is 99%.  Her vitals are 37.2, pulse rate 108, 

blood pressure 108/75, respiratory rate 26, 97% saturated on 4 L.  Chest x-ray 

has bilateral pneumonia that is gotten worse since her initial Chest ray 

December 4.





The patient is now admitted for acute respiratory failure due to Covid 

pneumonia.





- ALLERGIES


Allergies/Adverse Reactions: 


                                    Allergies











Allergy/AdvReac Type Severity Reaction Status Date / Time


 


No Known Drug Allergies Allergy   Verified 12/08/21 09:38














- MEDICATIONS


Home Medications: 


                                Ambulatory Orders











 Medication  Instructions  Recorded  Confirmed


 


Amox/Clav 875/125 [Augmentin 1 tablet PO Q12H 4 Days #8 tablet 12/15/21 





875/125 Tab]   


 


Benzonatate [Tessalon] 100 mg PO TID PRN #20 cap 12/15/21 


 


Saccharomyces Boulardii [Florastor] 250 mg PO BIDWM #8 cap 12/15/21 


 


dexAMETHasone [Decadron] 6 mg PO DAILY #6 tablet 12/15/21 














- PHYSICAL EXAM AT DISCHARGE


General Appearance: positive: No acute distress, Alert.  negative: Lethargic


Eyes Bilateral: positive: Normal inspection, PERRL, No lid inflammation


ENT: positive: ENT inspection nml, No signs of dehydration.  negative: Purulent 

nasal drainage


Neck: positive: Nml inspection, Trachea midline.  negative: Tracheal deviation


Respiratory: positive: Chest non-tender, No respiratory distress.  negative: 

Wheezes, Rales


Cardiovascular: positive: Regular rate & rhythm.  negative: Tachycardia, 

Bradycardia, Systolic murmur


Peripheral Pulses: positive: 2+


Abdomen: positive: Non-tender, Nml bowel sounds, No distention.  negative: 

Tenderness


Back: positive: Nml inspection


Skin: positive: Color nml, Warm, Dry.  negative: Cyanosis


Extremities: positive: Non-tender, Full ROM, Nml appearance


Neurologic/Psychiatric: positive: Oriented x3, Motor nml, Sensation nml, 

Mood/affect nml.  negative: Weakness, Sensory loss, Facial droop, Slurred/abnml 

speech, Depressed mood/affect





- LABS


Result Diagrams: 


                                 12/15/21 07:05





                                 12/15/21 07:05





- FOLLOW UP


Follow Up: 





You had significant improvement for your Covid 19 pneumonia. Now RT did O2 desat

 study for you. You have 96% O2 sat on room air but you still need 2 liter of O2

 by NC on exertion. You are prescribed home O2, please followup with RT 

instruction for how to safely use home O2. you may continue monitor your 

respiratory status after you are discharged to home. Should your feel difficult 

to breath, lower O2 saturation, you may present ER or call 911 for help. You may

 followup with [facility] for Covid 19 recommendations as well. 


You may follow-up with your PCP and recheck CBC in 1-2 weeks. should your 

symptoms return or worsen, you may present to the ER or call 911 for help 





- TIME SPENT


Time Spent in Discharge (Minutes): 30

## 2021-12-15 NOTE — DISCHARGE PLAN
Discharge Plan


Problem Reviewed?: Yes


Disposition: 01 Home, Self Care


Condition: Stable


Prescriptions: 


Amox/Clav 875/125 [Augmentin 875/125 Tab] 1 tablet PO Q12H 4 Days #8 tablet


dexAMETHasone [Decadron] 6 mg PO DAILY #6 tablet


Saccharomyces Boulardii [Florastor] 250 mg PO BIDWM #8 cap


Benzonatate [Tessalon] 100 mg PO TID PRN #20 cap


 PRN Reason: Cough


Diet: Regular


Activity Restrictions: Activity as Tolerated


Shower Restrictions: No (fall precaution)


Instruction Topics:  Amoxicillin Clavulanic Acid tablets, Benzonatate capsules, 

Dexamethasone tablets, COVID-19 [facility], COVID-19 

[facility] Department Statement, Oxygen Home Use


Health Concerns: 


Covid 19 pneumonia


Plan of Treatment: 


You had significant improvement for your Covid 19 pneumonia. Now RT did O2 desat

 study for you. You have 96% O2 sat on room air but you still need 2 liter of O2

 by NC on exertion. You are prescribed home O2, please followup with RT 

instruction for how to safely use home O2. you may continue monitor your 

respiratory status after you are discharged to home. Should your feel difficult 

to breath, lower O2 saturation, you may present ER or call 911 for help. You may

 followup with [facility] and [facility] for Covid 19 recommendations as well. 

 


Care Goals: 


Stabilization and improvement/Resolved of your medical problems


Assessment: 


You really wanted to be discharged on today to home. Discussed the care plan in 

detail with you, answered your question, you understood and agreed


Additional Instructions or Follow Up instructions: 


You may follow-up with your PCP in 1 to 2 weeks, should your symptoms return or 

worsen, you may present to the ER or call 911 for help


No Smoking: If you smoke, Please STOP!  Call [phone] for help.